# Patient Record
Sex: FEMALE | Race: OTHER | HISPANIC OR LATINO
[De-identification: names, ages, dates, MRNs, and addresses within clinical notes are randomized per-mention and may not be internally consistent; named-entity substitution may affect disease eponyms.]

---

## 2017-01-25 ENCOUNTER — APPOINTMENT (OUTPATIENT)
Dept: INTERNAL MEDICINE | Facility: CLINIC | Age: 47
End: 2017-01-25

## 2017-02-14 ENCOUNTER — APPOINTMENT (OUTPATIENT)
Dept: INTERNAL MEDICINE | Facility: CLINIC | Age: 47
End: 2017-02-14

## 2017-02-14 VITALS
DIASTOLIC BLOOD PRESSURE: 77 MMHG | HEIGHT: 60 IN | HEART RATE: 71 BPM | SYSTOLIC BLOOD PRESSURE: 114 MMHG | BODY MASS INDEX: 23.56 KG/M2 | WEIGHT: 120 LBS

## 2017-02-14 DIAGNOSIS — N61.1 ABSCESS OF THE BREAST AND NIPPLE: ICD-10-CM

## 2017-02-21 ENCOUNTER — CLINICAL ADVICE (OUTPATIENT)
Age: 47
End: 2017-02-21

## 2017-02-21 LAB
ANION GAP SERPL CALC-SCNC: 10 MEQ/L
BASOPHILS # BLD: 0.04 TH/MM3
BASOPHILS NFR BLD: 0.4 %
BUN SERPL-MCNC: 16 MG/DL
BUN/CREAT SERPL: 34 %
CALCIUM SERPL-MCNC: 9.9 MG/DL
CHLORIDE SERPL-SCNC: 104 MEQ/L
CHOLEST SERPL-MCNC: 180 MG/DL
CO2 SERPL-SCNC: 24 MEQ/L
CREAT SERPL-MCNC: 0.47 MG/DL
EOSINOPHIL # BLD: 0.2 TH/MM3
EOSINOPHIL NFR BLD: 2.2 %
ERYTHROCYTE [DISTWIDTH] IN BLOOD BY AUTOMATED COUNT: 18.4 %
ESTIMATED AVERGAGE GLUCOSE (NORTH): 126 MG/DL
GFR SERPL CREATININE-BSD FRML MDRD: 144
GLUCOSE SERPL-MCNC: 96 MG/DL
GRANULOCYTES # BLD: 5.45 TH/MM3
GRANULOCYTES NFR BLD: 60.1 %
HBA1C MFR BLD: 6 %
HCT VFR BLD AUTO: 35.4 %
HDLC SERPL-MCNC: 41 MG/DL
HDLC SERPL: 4.39
HGB BLD-MCNC: 11 G/DL
IMM GRANULOCYTES # BLD: 0.03 TH/MM3
IMM GRANULOCYTES NFR BLD: 0.3 %
LDLC SERPL DIRECT ASSAY-MCNC: 119 MG/DL
LYMPHOCYTES # BLD: 2.49 TH/MM3
LYMPHOCYTES NFR BLD: 27.4 %
MCH RBC QN AUTO: 24.2 PG
MCHC RBC AUTO-ENTMCNC: 31.1 G/DL
MCV RBC AUTO: 77.8 FL
MONOCYTES # BLD: 0.87 TH/MM3
MONOCYTES NFR BLD: 9.6 %
PLATELET # BLD: 364 TH/MM3
PMV BLD AUTO: 10.4 FL
POTASSIUM SERPL-SCNC: 4.3 MMOL/L
RBC # BLD AUTO: 4.55 MIL/MM3
SODIUM SERPL-SCNC: 138 MEQ/L
TRIGL SERPL-MCNC: 102 MG/DL
VLDLC SERPL-MCNC: 20 MG/DL
WBC # BLD: 9.08 TH/MM3

## 2017-02-23 ENCOUNTER — RESULT REVIEW (OUTPATIENT)
Age: 47
End: 2017-02-23

## 2017-02-23 PROBLEM — Z00.00 ENCOUNTER FOR PREVENTIVE HEALTH EXAMINATION: Noted: 2017-02-23

## 2017-02-27 ENCOUNTER — CLINICAL ADVICE (OUTPATIENT)
Age: 47
End: 2017-02-27

## 2017-02-27 LAB
FERRITIN SERPL-MCNC: 12 NG/ML
IRON SERPL-MCNC: 29 UG/DL
TIBC SERPL-MCNC: 555 UG/DL

## 2017-03-23 ENCOUNTER — APPOINTMENT (OUTPATIENT)
Dept: OBGYN | Facility: CLINIC | Age: 47
End: 2017-03-23

## 2017-05-06 ENCOUNTER — APPOINTMENT (OUTPATIENT)
Dept: INTERNAL MEDICINE | Facility: CLINIC | Age: 47
End: 2017-05-06

## 2017-05-10 ENCOUNTER — APPOINTMENT (OUTPATIENT)
Dept: BREAST CENTER | Facility: CLINIC | Age: 47
End: 2017-05-10

## 2017-05-10 VITALS
WEIGHT: 120 LBS | SYSTOLIC BLOOD PRESSURE: 100 MMHG | BODY MASS INDEX: 23.56 KG/M2 | DIASTOLIC BLOOD PRESSURE: 70 MMHG | HEIGHT: 60 IN

## 2017-05-11 ENCOUNTER — APPOINTMENT (OUTPATIENT)
Dept: OBGYN | Facility: CLINIC | Age: 47
End: 2017-05-11

## 2017-05-11 ENCOUNTER — OUTPATIENT (OUTPATIENT)
Dept: OUTPATIENT SERVICES | Facility: HOSPITAL | Age: 47
LOS: 1 days | Discharge: HOME | End: 2017-05-11

## 2017-05-11 ENCOUNTER — RESULT REVIEW (OUTPATIENT)
Age: 47
End: 2017-05-11

## 2017-05-11 VITALS
WEIGHT: 127 LBS | SYSTOLIC BLOOD PRESSURE: 100 MMHG | BODY MASS INDEX: 24.94 KG/M2 | DIASTOLIC BLOOD PRESSURE: 60 MMHG | HEIGHT: 60 IN

## 2017-05-15 ENCOUNTER — RESULT REVIEW (OUTPATIENT)
Age: 47
End: 2017-05-15

## 2017-05-25 LAB — HPV I/H RISK 1 DNA CVX QL PROBE+SIG AMP: NOT DETECTED

## 2017-06-28 DIAGNOSIS — Z01.419 ENCOUNTER FOR GYNECOLOGICAL EXAMINATION (GENERAL) (ROUTINE) WITHOUT ABNORMAL FINDINGS: ICD-10-CM

## 2017-07-08 ENCOUNTER — APPOINTMENT (OUTPATIENT)
Dept: INTERNAL MEDICINE | Facility: CLINIC | Age: 47
End: 2017-07-08

## 2017-07-08 ENCOUNTER — OUTPATIENT (OUTPATIENT)
Dept: OUTPATIENT SERVICES | Facility: HOSPITAL | Age: 47
LOS: 1 days | Discharge: HOME | End: 2017-07-08

## 2017-07-08 ENCOUNTER — RESULT REVIEW (OUTPATIENT)
Age: 47
End: 2017-07-08

## 2017-07-08 VITALS — SYSTOLIC BLOOD PRESSURE: 112 MMHG | DIASTOLIC BLOOD PRESSURE: 73 MMHG | HEIGHT: 60 IN | HEART RATE: 69 BPM

## 2017-07-08 VITALS — WEIGHT: 124 LBS | BODY MASS INDEX: 24.22 KG/M2

## 2017-07-08 DIAGNOSIS — N61.1 ABSCESS OF THE BREAST AND NIPPLE: ICD-10-CM

## 2017-07-08 RX ORDER — CHLORHEXIDINE GLUCONATE 4 %
325 (65 FE) LIQUID (ML) TOPICAL DAILY
Qty: 30 | Refills: 3 | Status: COMPLETED | COMMUNITY
Start: 2017-02-27 | End: 2017-06-08

## 2017-07-10 LAB
ABO + RH BLD: NORMAL
BASOPHILS # BLD: 0.03 TH/MM3
BASOPHILS NFR BLD: 0.3 %
BLD GP AB SCN SERPL QL: NEGATIVE
DIFFERENTIAL METHOD BLD: NORMAL
EOSINOPHIL # BLD: 0.24 TH/MM3
EOSINOPHIL NFR BLD: 2.4 %
ERYTHROCYTE [DISTWIDTH] IN BLOOD BY AUTOMATED COUNT: 14.5 %
FERRITIN SERPL-MCNC: 92 NG/ML
GRANULOCYTES # BLD: 5.87 TH/MM3
GRANULOCYTES NFR BLD: 58.3 %
HCT VFR BLD AUTO: 42.2 %
HGB BLD-MCNC: 13.9 G/DL
IMM GRANULOCYTES # BLD: 0.02 TH/MM3
IMM GRANULOCYTES NFR BLD: 0.2 %
LYMPHOCYTES # BLD: 2.96 TH/MM3
LYMPHOCYTES NFR BLD: 29.5 %
MCH RBC QN AUTO: 28.7 PG
MCHC RBC AUTO-ENTMCNC: 32.9 G/DL
MCV RBC AUTO: 87 FL
MONOCYTES # BLD: 0.93 TH/MM3
MONOCYTES NFR BLD: 9.3 %
PLATELET # BLD: 296 TH/MM3
PMV BLD AUTO: 11.1 FL
RBC # BLD AUTO: 4.85 MIL/MM3
TIBC SERPL-MCNC: 423 UG/DL
WBC # BLD: 10.05 TH/MM3

## 2017-08-04 ENCOUNTER — OUTPATIENT (OUTPATIENT)
Dept: OUTPATIENT SERVICES | Facility: HOSPITAL | Age: 47
LOS: 1 days | Discharge: HOME | End: 2017-08-04

## 2017-09-29 ENCOUNTER — OUTPATIENT (OUTPATIENT)
Dept: OUTPATIENT SERVICES | Facility: HOSPITAL | Age: 47
LOS: 1 days | Discharge: HOME | End: 2017-09-29

## 2017-09-29 DIAGNOSIS — K02.52 DENTAL CARIES ON PIT AND FISSURE SURFACE PENETRATING INTO DENTIN: ICD-10-CM

## 2017-10-06 ENCOUNTER — OUTPATIENT (OUTPATIENT)
Dept: OUTPATIENT SERVICES | Facility: HOSPITAL | Age: 47
LOS: 1 days | Discharge: HOME | End: 2017-10-06

## 2018-01-22 ENCOUNTER — OUTPATIENT (OUTPATIENT)
Dept: OUTPATIENT SERVICES | Facility: HOSPITAL | Age: 48
LOS: 1 days | Discharge: HOME | End: 2018-01-22

## 2018-01-22 DIAGNOSIS — Z12.31 ENCOUNTER FOR SCREENING MAMMOGRAM FOR MALIGNANT NEOPLASM OF BREAST: ICD-10-CM

## 2018-01-22 DIAGNOSIS — R92.8 OTHER ABNORMAL AND INCONCLUSIVE FINDINGS ON DIAGNOSTIC IMAGING OF BREAST: ICD-10-CM

## 2018-01-30 ENCOUNTER — OUTPATIENT (OUTPATIENT)
Dept: OUTPATIENT SERVICES | Facility: HOSPITAL | Age: 48
LOS: 1 days | Discharge: HOME | End: 2018-01-30

## 2018-02-02 ENCOUNTER — APPOINTMENT (OUTPATIENT)
Dept: BREAST CENTER | Facility: CLINIC | Age: 48
End: 2018-02-02
Payer: COMMERCIAL

## 2018-02-02 VITALS
HEART RATE: 77 BPM | BODY MASS INDEX: 24.35 KG/M2 | HEIGHT: 60 IN | DIASTOLIC BLOOD PRESSURE: 68 MMHG | SYSTOLIC BLOOD PRESSURE: 112 MMHG | OXYGEN SATURATION: 98 % | WEIGHT: 124 LBS

## 2018-02-02 PROCEDURE — 99213 OFFICE O/P EST LOW 20 MIN: CPT

## 2018-02-09 DIAGNOSIS — D24.2 BENIGN NEOPLASM OF LEFT BREAST: ICD-10-CM

## 2018-02-09 DIAGNOSIS — D24.1 BENIGN NEOPLASM OF RIGHT BREAST: ICD-10-CM

## 2018-02-09 DIAGNOSIS — N63.20 UNSPECIFIED LUMP IN THE LEFT BREAST, UNSPECIFIED QUADRANT: ICD-10-CM

## 2018-05-10 ENCOUNTER — APPOINTMENT (OUTPATIENT)
Dept: OBGYN | Facility: CLINIC | Age: 48
End: 2018-05-10

## 2018-05-10 ENCOUNTER — OUTPATIENT (OUTPATIENT)
Dept: OUTPATIENT SERVICES | Facility: HOSPITAL | Age: 48
LOS: 1 days | Discharge: HOME | End: 2018-05-10

## 2018-05-10 VITALS
BODY MASS INDEX: 24.35 KG/M2 | HEIGHT: 60 IN | SYSTOLIC BLOOD PRESSURE: 110 MMHG | DIASTOLIC BLOOD PRESSURE: 60 MMHG | WEIGHT: 124 LBS

## 2018-05-11 DIAGNOSIS — Z01.419 ENCOUNTER FOR GYNECOLOGICAL EXAMINATION (GENERAL) (ROUTINE) WITHOUT ABNORMAL FINDINGS: ICD-10-CM

## 2018-07-18 ENCOUNTER — OUTPATIENT (OUTPATIENT)
Dept: OUTPATIENT SERVICES | Facility: HOSPITAL | Age: 48
LOS: 1 days | Discharge: HOME | End: 2018-07-18

## 2018-07-29 ENCOUNTER — FORM ENCOUNTER (OUTPATIENT)
Age: 48
End: 2018-07-29

## 2018-07-30 ENCOUNTER — OUTPATIENT (OUTPATIENT)
Dept: OUTPATIENT SERVICES | Facility: HOSPITAL | Age: 48
LOS: 1 days | Discharge: HOME | End: 2018-07-30

## 2018-07-30 DIAGNOSIS — R92.8 OTHER ABNORMAL AND INCONCLUSIVE FINDINGS ON DIAGNOSTIC IMAGING OF BREAST: ICD-10-CM

## 2018-08-03 ENCOUNTER — APPOINTMENT (OUTPATIENT)
Dept: BREAST CENTER | Facility: CLINIC | Age: 48
End: 2018-08-03
Payer: COMMERCIAL

## 2018-08-03 PROCEDURE — 99213 OFFICE O/P EST LOW 20 MIN: CPT

## 2018-08-21 ENCOUNTER — OUTPATIENT (OUTPATIENT)
Dept: OUTPATIENT SERVICES | Facility: HOSPITAL | Age: 48
LOS: 1 days | Discharge: HOME | End: 2018-08-21

## 2018-08-21 ENCOUNTER — APPOINTMENT (OUTPATIENT)
Dept: INTERNAL MEDICINE | Facility: CLINIC | Age: 48
End: 2018-08-21

## 2018-08-21 VITALS
SYSTOLIC BLOOD PRESSURE: 133 MMHG | WEIGHT: 125 LBS | DIASTOLIC BLOOD PRESSURE: 85 MMHG | HEART RATE: 81 BPM | HEIGHT: 60 IN | BODY MASS INDEX: 24.54 KG/M2

## 2018-08-21 NOTE — HISTORY OF PRESENT ILLNESS
[FreeTextEntry1] : Follow Up [de-identified] : 48 Y F presents to the clinic for regular follow up. She denies any complains of chest pain, SOB, abdominal pain. No complains of dizziness , tiredness.

## 2018-08-21 NOTE — ASSESSMENT
[FreeTextEntry1] : #) History Iron Deficiency anemia\par Denies any signs of lethargy, tiredness, SOB. \par \par #) History of Fibroadenoma Left side\par Following up surgery and Gyn. Breast mass stable, Repeat  mammogram and US recommended on 1/22/19\par \par \par #)HCM\par \par Pap smear negative in 4/2107, mammogram scheduled in January, 2018\par Repeat routine labs- CBC, CMP, Hb A1c, Lipid profile

## 2018-08-21 NOTE — PHYSICAL EXAM
[No Acute Distress] : no acute distress [Well Nourished] : well nourished [Supple] : supple [No Respiratory Distress] : no respiratory distress  [Clear to Auscultation] : lungs were clear to auscultation bilaterally [Normal Rate] : normal rate  [Regular Rhythm] : with a regular rhythm [Normal S1, S2] : normal S1 and S2 [Soft] : abdomen soft [Non Tender] : non-tender [Normal Insight/Judgement] : insight and judgment were intact

## 2018-08-24 DIAGNOSIS — R73.9 HYPERGLYCEMIA, UNSPECIFIED: ICD-10-CM

## 2018-08-24 DIAGNOSIS — Z00.00 ENCOUNTER FOR GENERAL ADULT MEDICAL EXAMINATION WITHOUT ABNORMAL FINDINGS: ICD-10-CM

## 2018-08-27 LAB
25(OH)D3 SERPL-MCNC: 15 NG/ML
ALBUMIN SERPL ELPH-MCNC: 4.4 G/DL
ALP BLD-CCNC: 74 U/L
ALT SERPL-CCNC: 66 U/L
ANION GAP SERPL CALC-SCNC: 14 MMOL/L
AST SERPL-CCNC: 44 U/L
BASOPHILS # BLD AUTO: 0.07 K/UL
BASOPHILS NFR BLD AUTO: 0.8 %
BILIRUB SERPL-MCNC: 0.3 MG/DL
BUN SERPL-MCNC: 12 MG/DL
CALCIUM SERPL-MCNC: 10.1 MG/DL
CHLORIDE SERPL-SCNC: 103 MMOL/L
CHOLEST SERPL-MCNC: 169 MG/DL
CHOLEST/HDLC SERPL: 3.8 RATIO
CO2 SERPL-SCNC: 23 MMOL/L
CREAT SERPL-MCNC: 0.5 MG/DL
EOSINOPHIL # BLD AUTO: 0.2 K/UL
EOSINOPHIL NFR BLD AUTO: 2.3 %
ESTIMATED AVERAGE GLUCOSE: 120 MG/DL
GLUCOSE SERPL-MCNC: 93 MG/DL
HBA1C MFR BLD HPLC: 5.8 %
HCT VFR BLD CALC: 39.5 %
HDLC SERPL-MCNC: 44 MG/DL
HGB BLD-MCNC: 12.5 G/DL
IMM GRANULOCYTES NFR BLD AUTO: 0.3 %
IRON SATN MFR SERPL: 14 %
IRON SERPL-MCNC: 60 UG/DL
LDLC SERPL CALC-MCNC: 119 MG/DL
LYMPHOCYTES # BLD AUTO: 3.6 K/UL
LYMPHOCYTES NFR BLD AUTO: 41.7 %
MAN DIFF?: NORMAL
MCHC RBC-ENTMCNC: 27.4 PG
MCHC RBC-ENTMCNC: 31.6 G/DL
MCV RBC AUTO: 86.6 FL
MONOCYTES # BLD AUTO: 0.85 K/UL
MONOCYTES NFR BLD AUTO: 9.8 %
NEUTROPHILS # BLD AUTO: 3.89 K/UL
NEUTROPHILS NFR BLD AUTO: 45.1 %
PLATELET # BLD AUTO: 342 K/UL
POTASSIUM SERPL-SCNC: 4.1 MMOL/L
PROT SERPL-MCNC: 7.3 G/DL
RBC # BLD: 4.56 M/UL
RBC # FLD: 14.6 %
SODIUM SERPL-SCNC: 140 MMOL/L
TIBC SERPL-MCNC: 435 UG/DL
TRIGL SERPL-MCNC: 108 MG/DL
UIBC SERPL-MCNC: 375 UG/DL
WBC # FLD AUTO: 8.64 K/UL

## 2018-09-11 DIAGNOSIS — N13.30 UNSPECIFIED HYDRONEPHROSIS: ICD-10-CM

## 2018-09-12 ENCOUNTER — OUTPATIENT (OUTPATIENT)
Dept: OUTPATIENT SERVICES | Facility: HOSPITAL | Age: 48
LOS: 1 days | Discharge: HOME | End: 2018-09-12

## 2018-09-12 DIAGNOSIS — R74.8 ABNORMAL LEVELS OF OTHER SERUM ENZYMES: ICD-10-CM

## 2018-09-13 PROBLEM — N13.30 HYDRONEPHROSIS OF RIGHT KIDNEY: Status: ACTIVE | Noted: 2018-09-13

## 2019-02-08 ENCOUNTER — APPOINTMENT (OUTPATIENT)
Dept: BREAST CENTER | Facility: CLINIC | Age: 49
End: 2019-02-08
Payer: COMMERCIAL

## 2019-02-08 VITALS
BODY MASS INDEX: 24.54 KG/M2 | HEIGHT: 60 IN | SYSTOLIC BLOOD PRESSURE: 110 MMHG | TEMPERATURE: 98.4 F | WEIGHT: 125 LBS | DIASTOLIC BLOOD PRESSURE: 80 MMHG

## 2019-02-08 PROCEDURE — 99213 OFFICE O/P EST LOW 20 MIN: CPT

## 2019-02-10 NOTE — ASSESSMENT
[FreeTextEntry1] : Eladio is a 48F with a prior history of left breast abscess, now found to have biopsy proven fibroadeomas in bilateral breasts and bilateral BIRADS 3 breast masses.\par \par Oh physical exam, I do not palpate any suspicious abnormalities in either breast.  She additionally appears to have a hypertrophic scar in the area of her prior abscess site which we will continue to follow clinically. She is due for a b/l mammogram and US.  THis will be scheduled for her today. IF her repeat imaging does not reveal any other abnormalities, I will have her follow up in 6 months for a repeat R breast US for a mass @2-3:00, 4 cm FN BIRADS 3.\par \par Fibroadenomas are benign lesions without any malignant potential.  These are now classified as fibroproliferative lesions without atypia with a slightly increased relative risk compared to the reference population for breast cancer.  THey may increase or decrease in size and usually regress in post menopausal women. Indications for surgical excision include rapidly enlarging lesions and symptomatic lesions. At this time, neither lesion is symptomatic and thus surgical intervention is not required at this time. \par \par We also discussed dense breasts.  Increasing breast density has been found to increase ones risk of breast cancer, but at this time, there is no clear indication for additional imaging in this setting, as both US and MRI have not been found to improve survival.  One can consider bilateral screening US in this setting.  However, out of 1000 women screened, the use of routine US will only identify an additional 3-4 cancers.  The use of US was found to increase the likelihood of undergoing more imaging and more biopsies.  This was discussed with the patient as she does have dense breasts.  \par \par All of her questions were answered. She knows to call with any further questions or concerns.  \par \par PLAN: \par -b/l mammogram and US NOW\par -if repeat imaging is normal, then follow up in 6 months after a R breast US (BIRADS 3 lesion in right breast)

## 2019-02-10 NOTE — PAST MEDICAL HISTORY
[Perimenopausal] : The patient is perimenopausal [Menarche Age ____] : age at menarche was [unfilled] [Definite ___ (Date)] : the last menstrual period was [unfilled] [Total Preg ___] : G[unfilled] [Live Births ___] : P[unfilled]  [Age At Live Birth ___] : Age at live birth: [unfilled] [FreeTextEntry6] : denies  [FreeTextEntry7] : denies  [FreeTextEntry8] : denies

## 2019-02-10 NOTE — PHYSICAL EXAM
[Normocephalic] : normocephalic [No Supraclavicular Adenopathy] : no supraclavicular adenopathy [No Cervical Adenopathy] : no cervical adenopathy [No Axillary Lymphadenopathy] : no left axillary lymphadenopathy [EOMI] : extra ocular movement intact [Examined in the supine and seated position] : examined in the supine and seated position [No dominant masses] : no dominant masses in right breast  [No dominant masses] : no dominant masses left breast [No Nipple Retraction] : no left nipple retraction [No Nipple Discharge] : no left nipple discharge [Soft] : abdomen soft [Not Tender] : non-tender [No Edema] : no edema [No Rashes] : no rashes [No Ulceration] : no ulceration [de-identified] : no suspicious masses palpated  [de-identified] : hypertrophic scar present in lower inner quadrant, no other signs of abscess recurrence, no erythema or induration, fullness palpated in the UOQ.  no other discrete masses palpated.

## 2019-02-10 NOTE — REVIEW OF SYSTEMS
[As Noted in HPI] : as noted in HPI [Skin Lesions] : skin lesion [Negative] : Neurological [Fever] : no fever [Chills] : no chills [Feeling Poorly] : not feeling poorly [Feeling Tired] : not feeling tired [Breast Pain] : no breast pain [Breast Lump] : no breast lump [Hot Flashes] : no hot flashes

## 2019-02-10 NOTE — HISTORY OF PRESENT ILLNESS
[FreeTextEntry1] : Eladio is a 48F who presents as a 6 month follow up.  She was initially seen because of a left breast abscess for which she underwent a US guided drainage procedure without any further recurrence of the abscess. \par \par On her most recent imaging performed on 1/22/18, she was found to have 2 asymmetries in the L breast that effaced with spot compression and likely represented fibroglandular tissue.  She additionally underwent bilateral US which revealed the following: \par \par 1/22/18 -- bilateral breast US \par -R breast: @9:00, 2-3 cm from nipple, 2 x 1.3 x 0.8 cm irregular hypoechoic mass --> rec biopsy \par -R breast: @2-3:00, 4 cm from nipple, 1.1 x 1.1 x 0.4 cm circumscribed hypoechoic mass --> rec 6 month follow R breast US \par -L breast: @9:00, 5 cm from nipple, 1.3 x 1.1 x 0.6 cm irregular hypoechoic mass --> rec biopsy\par \par 1/30/18 -- bilateral US guided biopsies\par -R breast @9:00, 2-3 cm from nipple --> fibroadenoma \par -L breast @9:00, 5 cm from nipple --> fibroadenoma/PASH \par \par INTERVAL HISTORY: \par Eladio returns for a 6 month follow up.  \par She was scheduled for her screening mammogram/US as well as short term follow up for b/l BIRADS 3 breast masses but she has not gone for this testing yet.  \par \par She denies any breast related complaints at this time.  She denies any breast pain, no nipple discharge and no palpable lesions.  She denies any fevers or chills and has not had any recurrence of her left breast abscess.  Although she does have a residual skin lesion in the area of her prior abscess site.

## 2019-02-19 ENCOUNTER — FORM ENCOUNTER (OUTPATIENT)
Age: 49
End: 2019-02-19

## 2019-02-20 ENCOUNTER — OUTPATIENT (OUTPATIENT)
Dept: OUTPATIENT SERVICES | Facility: HOSPITAL | Age: 49
LOS: 1 days | Discharge: HOME | End: 2019-02-20

## 2019-02-20 DIAGNOSIS — R92.8 OTHER ABNORMAL AND INCONCLUSIVE FINDINGS ON DIAGNOSTIC IMAGING OF BREAST: ICD-10-CM

## 2019-03-09 ENCOUNTER — APPOINTMENT (OUTPATIENT)
Dept: INTERNAL MEDICINE | Facility: CLINIC | Age: 49
End: 2019-03-09

## 2019-03-09 ENCOUNTER — LABORATORY RESULT (OUTPATIENT)
Age: 49
End: 2019-03-09

## 2019-03-09 ENCOUNTER — OUTPATIENT (OUTPATIENT)
Dept: OUTPATIENT SERVICES | Facility: HOSPITAL | Age: 49
LOS: 1 days | Discharge: HOME | End: 2019-03-09

## 2019-03-09 VITALS
HEART RATE: 66 BPM | DIASTOLIC BLOOD PRESSURE: 75 MMHG | HEIGHT: 60 IN | WEIGHT: 124 LBS | SYSTOLIC BLOOD PRESSURE: 114 MMHG | BODY MASS INDEX: 24.35 KG/M2

## 2019-03-09 NOTE — PHYSICAL EXAM
[No Acute Distress] : no acute distress [Well Nourished] : well nourished [Well Developed] : well developed [Normal Sclera/Conjunctiva] : normal sclera/conjunctiva [Normal Outer Ear/Nose] : the outer ears and nose were normal in appearance [No JVD] : no jugular venous distention [No Lymphadenopathy] : no lymphadenopathy [Clear to Auscultation] : lungs were clear to auscultation bilaterally [Regular Rhythm] : with a regular rhythm [Normal S1, S2] : normal S1 and S2 [No Abdominal Bruit] : a ~M bruit was not heard ~T in the abdomen [Soft] : abdomen soft [Non Tender] : non-tender [Normal Bowel Sounds] : normal bowel sounds [No CVA Tenderness] : no CVA  tenderness [No Joint Swelling] : no joint swelling [No Rash] : no rash [Normal Gait] : normal gait

## 2019-03-11 LAB
ALBUMIN SERPL ELPH-MCNC: 4.3 G/DL
ALP BLD-CCNC: 64 U/L
ALT SERPL-CCNC: 69 U/L
ANION GAP SERPL CALC-SCNC: 11 MMOL/L
AST SERPL-CCNC: 49 U/L
BASOPHILS # BLD AUTO: 0.06 K/UL
BASOPHILS NFR BLD AUTO: 0.7 %
BILIRUB SERPL-MCNC: 0.3 MG/DL
BUN SERPL-MCNC: 14 MG/DL
CALCIUM SERPL-MCNC: 9.5 MG/DL
CHLORIDE SERPL-SCNC: 103 MMOL/L
CHOLEST SERPL-MCNC: 164 MG/DL
CHOLEST/HDLC SERPL: 3.9 RATIO
CO2 SERPL-SCNC: 25 MMOL/L
CREAT SERPL-MCNC: 0.5 MG/DL
EOSINOPHIL # BLD AUTO: 0.19 K/UL
EOSINOPHIL NFR BLD AUTO: 2.2 %
GLUCOSE SERPL-MCNC: 82 MG/DL
HCT VFR BLD CALC: 39.3 %
HDLC SERPL-MCNC: 42 MG/DL
HGB BLD-MCNC: 12.8 G/DL
IMM GRANULOCYTES NFR BLD AUTO: 0.1 %
LDLC SERPL CALC-MCNC: 115 MG/DL
LYMPHOCYTES # BLD AUTO: 3.47 K/UL
LYMPHOCYTES NFR BLD AUTO: 40.5 %
MAN DIFF?: NORMAL
MCHC RBC-ENTMCNC: 29 PG
MCHC RBC-ENTMCNC: 32.6 G/DL
MCV RBC AUTO: 88.9 FL
MONOCYTES # BLD AUTO: 0.79 K/UL
MONOCYTES NFR BLD AUTO: 9.2 %
NEUTROPHILS # BLD AUTO: 4.04 K/UL
NEUTROPHILS NFR BLD AUTO: 47.3 %
PLATELET # BLD AUTO: 322 K/UL
POTASSIUM SERPL-SCNC: 4.1 MMOL/L
PROT SERPL-MCNC: 7 G/DL
RBC # BLD: 4.42 M/UL
RBC # FLD: 14.1 %
SODIUM SERPL-SCNC: 139 MMOL/L
TRIGL SERPL-MCNC: 120 MG/DL
TSH SERPL-ACNC: 4.11 UIU/ML
WBC # FLD AUTO: 8.56 K/UL

## 2019-03-13 NOTE — REVIEW OF SYSTEMS
[Fever] : no fever [Chills] : no chills [Night Sweats] : no night sweats [Discharge] : no discharge [Earache] : no earache [Chest Pain] : no chest pain [Palpitations] : no palpitations [Orthopnea] : no orthopnea [Shortness Of Breath] : no shortness of breath [Wheezing] : no wheezing [Abdominal Pain] : no abdominal pain [Nausea] : no nausea [Constipation] : no constipation [Vomiting] : no vomiting [Dysuria] : no dysuria [Joint Pain] : no joint pain [Headache] : no headache [Suicidal] : not suicidal

## 2019-03-13 NOTE — HISTORY OF PRESENT ILLNESS
[FreeTextEntry1] : Routine follow-up - annual [de-identified] : 47 yo female patient with no pertinent past medical history, pesents to the clinic for regular follow up. She denies any complaints at this time.\par Patient has history of fibroadenoma, follows with mammogram every 6m (done last month).\par No active issues\par

## 2019-04-09 ENCOUNTER — OUTPATIENT (OUTPATIENT)
Dept: OUTPATIENT SERVICES | Facility: HOSPITAL | Age: 49
LOS: 1 days | Discharge: HOME | End: 2019-04-09

## 2019-05-06 ENCOUNTER — OUTPATIENT (OUTPATIENT)
Dept: OUTPATIENT SERVICES | Facility: HOSPITAL | Age: 49
LOS: 1 days | Discharge: HOME | End: 2019-05-06

## 2019-05-06 DIAGNOSIS — K03.0 EXCESSIVE ATTRITION OF TEETH: ICD-10-CM

## 2019-05-23 ENCOUNTER — LABORATORY RESULT (OUTPATIENT)
Age: 49
End: 2019-05-23

## 2019-05-23 ENCOUNTER — APPOINTMENT (OUTPATIENT)
Dept: OBGYN | Facility: CLINIC | Age: 49
End: 2019-05-23
Payer: COMMERCIAL

## 2019-05-23 ENCOUNTER — OUTPATIENT (OUTPATIENT)
Dept: OUTPATIENT SERVICES | Facility: HOSPITAL | Age: 49
LOS: 1 days | Discharge: HOME | End: 2019-05-23

## 2019-05-23 VITALS
BODY MASS INDEX: 23.95 KG/M2 | SYSTOLIC BLOOD PRESSURE: 100 MMHG | DIASTOLIC BLOOD PRESSURE: 60 MMHG | HEIGHT: 60 IN | WEIGHT: 122 LBS

## 2019-05-23 DIAGNOSIS — Z86.2 PERSONAL HISTORY OF DISEASES OF THE BLOOD AND BLOOD-FORMING ORGANS AND CERTAIN DISORDERS INVOLVING THE IMMUNE MECHANISM: ICD-10-CM

## 2019-05-23 PROCEDURE — 99396 PREV VISIT EST AGE 40-64: CPT

## 2019-05-23 NOTE — HISTORY OF PRESENT ILLNESS
[Sexually Active] : is sexually active [Male ___] : [unfilled] male [Definite:  ___ (Date)] : the last menstrual period was [unfilled] [Normal Amount/Duration] : was of a normal amount and duration [Regular Cycle Intervals] : periods have been regular [Frequency: Q ___ days] : menstrual periods occur approximately every [unfilled] days [Menarche Age: ____] : age at menarche was [unfilled] [Monogamous] : is monogamous [Contraception] : uses contraception [Spotting Between  Menses] : no spotting between menses [de-identified] :

## 2019-05-23 NOTE — END OF VISIT
[] : Resident [FreeTextEntry3] : Uncomplicated annual and cervical polypectomy. Follow up pathology. RTC 1 year for annual and pap.

## 2019-05-23 NOTE — PHYSICAL EXAM
[Awake] : awake [Soft] : soft [Normal] : uterus [Labia Majora] : labia major [Alert] : not alert [Acute Distress] : no acute distress [LAD] : no lymphadenopathy [Thyroid Nodule] : no thyroid nodule [Goiter] : no goiter [Mass] : no breast mass [Nipple Discharge] : no nipple discharge [Axillary LAD] : no axillary lymphadenopathy [Tender] : non tender [Distended] : not distended [H/Smegaly] : no hepatosplenomegaly [FreeTextEntry5] : pink cm polyp visualized at cervical os, removed without difficulty

## 2019-05-24 DIAGNOSIS — N84.1 POLYP OF CERVIX UTERI: ICD-10-CM

## 2019-05-24 DIAGNOSIS — Z01.419 ENCOUNTER FOR GYNECOLOGICAL EXAMINATION (GENERAL) (ROUTINE) WITHOUT ABNORMAL FINDINGS: ICD-10-CM

## 2019-07-30 ENCOUNTER — APPOINTMENT (OUTPATIENT)
Dept: INTERNAL MEDICINE | Facility: CLINIC | Age: 49
End: 2019-07-30

## 2019-07-30 ENCOUNTER — OUTPATIENT (OUTPATIENT)
Dept: OUTPATIENT SERVICES | Facility: HOSPITAL | Age: 49
LOS: 1 days | Discharge: HOME | End: 2019-07-30

## 2019-07-30 ENCOUNTER — RESULT CHARGE (OUTPATIENT)
Age: 49
End: 2019-07-30

## 2019-07-30 VITALS
TEMPERATURE: 97.9 F | HEART RATE: 79 BPM | DIASTOLIC BLOOD PRESSURE: 76 MMHG | BODY MASS INDEX: 24.74 KG/M2 | HEIGHT: 60 IN | WEIGHT: 126 LBS | SYSTOLIC BLOOD PRESSURE: 118 MMHG

## 2019-07-30 DIAGNOSIS — Z87.09 PERSONAL HISTORY OF OTHER DISEASES OF THE RESPIRATORY SYSTEM: ICD-10-CM

## 2019-07-31 DIAGNOSIS — J02.9 ACUTE PHARYNGITIS, UNSPECIFIED: ICD-10-CM

## 2019-08-07 LAB — S PYO AG SPEC QL IA: NORMAL

## 2019-08-08 NOTE — REVIEW OF SYSTEMS
[Chills] : chills [Fever] : fever [Fatigue] : fatigue [Sore Throat] : sore throat [Negative] : Neurological [Night Sweats] : no night sweats [Discharge] : no discharge [Hearing Loss] : no hearing loss [Hoarseness] : no hoarseness [Nasal Discharge] : no nasal discharge [Chest Pain] : no chest pain [Palpitations] : no palpitations [Orthopnea] : no orthopnea [Shortness Of Breath] : no shortness of breath [Wheezing] : no wheezing [Abdominal Pain] : no abdominal pain [Nausea] : no nausea [Constipation] : no constipation [Vomiting] : no vomiting [Dysuria] : no dysuria [Joint Pain] : no joint pain [Headache] : no headache [Suicidal] : not suicidal

## 2019-08-08 NOTE — PHYSICAL EXAM
[No Acute Distress] : no acute distress [Well Nourished] : well nourished [Well Developed] : well developed [Normal Sclera/Conjunctiva] : normal sclera/conjunctiva [Normal Outer Ear/Nose] : the outer ears and nose were normal in appearance [Clear to Auscultation] : lungs were clear to auscultation bilaterally [Regular Rhythm] : with a regular rhythm [Normal S1, S2] : normal S1 and S2 [No Abdominal Bruit] : a ~M bruit was not heard ~T in the abdomen [Soft] : abdomen soft [Non Tender] : non-tender [No CVA Tenderness] : no CVA  tenderness [No Joint Swelling] : no joint swelling [Normal Bowel Sounds] : normal bowel sounds [No Rash] : no rash [Normal Gait] : normal gait [No Respiratory Distress] : no respiratory distress  [No Edema] : there was no peripheral edema [de-identified] : left submandibular palpable LN [de-identified] : exudative discharge on the right tonsil

## 2019-08-08 NOTE — HISTORY OF PRESENT ILLNESS
[FreeTextEntry1] : Fever and sore throat  [de-identified] : 50 yo female patient with no pertinent past medical history, presents to the clinic for fever and sore throat. She mentioned that last saturday she started to have high fever + sore throat + fatigue. She denies any cough, or runny nose. Denies any sick contact. She also noted left submandibular LN. \par \par Of note, patient has h/o breast masses biopsied in 2018 showed fibroadenomas. Most recent mammo in February BI-RADS 3 recommended ultrasound follow up in 6 months. She is followed by breast surgery. She denies breast related complaints at this time.\par \par PAP: 2017 NILM, HPV not detected\par Mammo: 2/20/19 BI-RADS 3 \par \par Patient was last seen by GYN on 5/23/2019 . She had an uncomplicated cervical polyp removal with pap smear consistent with a benign polyp. \par

## 2019-08-08 NOTE — ASSESSMENT
[FreeTextEntry1] : 48 yo female patient with history of breast fibroadenoma, pesents to the clinic for regular follow up.\par \par # Fever + sore throat\par - Fever + exudative discharge + palpable LN with absence of cough\par - Rapid Strep A done in clinic: negative\par - Likely viral infection, supportive treatment.\par \par # Bilateral breast fibroadenoma\par - Last Mammo: 2/20/19 BI-RADS 3, repeat Mammogram in 6 months. \par - Will request a new mammogram\par - Following with breast surgery\par \par # Healthcare maintenance\par Uptodate with mammogram\par PAP: 2017 NILM, HPV not detected\par \par # Vitamin D deficiency\par on  vitamin D 2000 daily\par \par # Prediabetes\par HbA1c was 6, down trending to 5,5 most recently\par continue diet control \par \par RTC in 6 months

## 2019-08-20 ENCOUNTER — FORM ENCOUNTER (OUTPATIENT)
Age: 49
End: 2019-08-20

## 2019-08-21 ENCOUNTER — OUTPATIENT (OUTPATIENT)
Dept: OUTPATIENT SERVICES | Facility: HOSPITAL | Age: 49
LOS: 1 days | Discharge: HOME | End: 2019-08-21
Payer: OTHER GOVERNMENT

## 2019-08-21 DIAGNOSIS — R92.8 OTHER ABNORMAL AND INCONCLUSIVE FINDINGS ON DIAGNOSTIC IMAGING OF BREAST: ICD-10-CM

## 2019-08-21 PROCEDURE — 76642 ULTRASOUND BREAST LIMITED: CPT | Mod: 26,RT

## 2019-09-16 ENCOUNTER — APPOINTMENT (OUTPATIENT)
Dept: BREAST CENTER | Facility: CLINIC | Age: 49
End: 2019-09-16
Payer: COMMERCIAL

## 2019-09-16 VITALS
WEIGHT: 126 LBS | TEMPERATURE: 98.7 F | SYSTOLIC BLOOD PRESSURE: 116 MMHG | BODY MASS INDEX: 24.74 KG/M2 | HEIGHT: 60 IN | DIASTOLIC BLOOD PRESSURE: 78 MMHG

## 2019-09-16 PROCEDURE — 99213 OFFICE O/P EST LOW 20 MIN: CPT

## 2019-09-16 NOTE — ASSESSMENT
[FreeTextEntry1] : Eladio is a 49F with a prior history of left breast abscess, now found to have biopsy proven fibroadeomas in bilateral breasts and a right breast BIRADS 3 mass.\par \par Oh physical exam, I do not palpate any suspicious abnormalities in either breast, although she does have nondiscrete nodularities in both breasts.  She additionally appears to have a hypertrophic scar in the area of her prior abscess site which we will continue to follow clinically. \par \par She had a b/l dx mammogram and R breast US on 2/20/19, which revealed stability of her R breast BIRADS 3 lesion @2-3N4, stable probably benign hypoechoic mass, measuring 1.2 x 1 x 0.4 cm.\par \par She also had a repeat R breast US on 8/21/19 which revealed stability of her oval circumscribed mass @2-3N4, measuring 1.1 x 1 x 0.4 cm, deemed BIRADS 3. \par \par Her next imaging will be a b/l mammogram and b/l US on 2/20/2020.  THis will be scheduled for her today. I will have her follow up in 6 months after her repeat imaging for a CBE. \par \par Fibroadenomas are benign lesions without any malignant potential.  These are now classified as fibroproliferative lesions without atypia with a slightly increased relative risk compared to the reference population for breast cancer.  THey may increase or decrease in size and usually regress in post menopausal women. Indications for surgical excision include rapidly enlarging lesions and symptomatic lesions. At this time, neither lesion is symptomatic and thus surgical intervention is not required at this time. \par \par We also discussed dense breasts.  Increasing breast density has been found to increase ones risk of breast cancer, but at this time, there is no clear indication for additional imaging in this setting, as both US and MRI have not been found to improve survival.  One can consider bilateral screening US in this setting.  However, out of 1000 women screened, the use of routine US will only identify an additional 3-4 cancers.  The use of US was found to increase the likelihood of undergoing more imaging and more biopsies.  This was discussed with the patient as she does have dense breasts.  \par \par All of her questions were answered. She knows to call with any further questions or concerns.  \par \par PLAN: \par -b/l mammogram and b/l US (dense breast screening, R breast BIRADS 3) for 2/20/2020, f/up after

## 2019-09-16 NOTE — PHYSICAL EXAM
[Normocephalic] : normocephalic [EOMI] : extra ocular movement intact [No Supraclavicular Adenopathy] : no supraclavicular adenopathy [Examined in the supine and seated position] : examined in the supine and seated position [No Cervical Adenopathy] : no cervical adenopathy [No dominant masses] : no dominant masses in right breast  [No dominant masses] : no dominant masses left breast [No Nipple Retraction] : no left nipple retraction [No Nipple Discharge] : no right nipple discharge [No Axillary Lymphadenopathy] : no left axillary lymphadenopathy [Soft] : abdomen soft [Not Tender] : non-tender [No Rashes] : no rashes [No Edema] : no edema [No Ulceration] : no ulceration [de-identified] : no suspicious masses palpated, however she does have nodularities present @9-10N2, @7-8N1 [de-identified] : hypertrophic scar present in lower inner quadrant, no other signs of abscess recurrence, no erythema or induration, fullness palpated in the UOQ.  no other discrete masses palpated. nodularity palpated @2N1-2

## 2019-09-16 NOTE — HISTORY OF PRESENT ILLNESS
[FreeTextEntry1] : Eladio is a 49F who presents as a 6 month follow up.  She was initially seen because of a left breast abscess for which she underwent a US guided drainage procedure without any further recurrence of the abscess. \par \par On her most recent imaging performed on 1/22/18, she was found to have 2 asymmetries in the L breast that effaced with spot compression and likely represented fibroglandular tissue.  She additionally underwent bilateral US which revealed the following: \par \par 1/22/18 -- bilateral breast US \par -R breast: @9:00, 2-3 cm from nipple, 2 x 1.3 x 0.8 cm irregular hypoechoic mass --> rec biopsy \par -R breast: @2-3:00, 4 cm from nipple, 1.1 x 1.1 x 0.4 cm circumscribed hypoechoic mass --> rec 6 month follow R breast US \par -L breast: @9:00, 5 cm from nipple, 1.3 x 1.1 x 0.6 cm irregular hypoechoic mass --> rec biopsy\par \par 1/30/18 -- bilateral US guided biopsies\par -R breast @9:00, 2-3 cm from nipple --> fibroadenoma \par -L breast @9:00, 5 cm from nipple --> fibroadenoma/PASH \par \par INTERVAL HISTORY: \par Eladio returns for a 6 month follow up.  \par \par She denies any breast related complaints at this time.  She denies any breast pain, no nipple discharge and no palpable lesions.  She denies any fevers or chills and has not had any recurrence of her left breast abscess.  Although she does have a residual skin lesion in the area of her prior abscess site. \par \par Since her last visit, she had a b/l dx mammogram and R breast US on 2/20/19, which revealed stability of her R breast BIRADS 3 lesion @2-3N4, stable probably benign hypoechoic mass, measuring 1.2 x 1 x 0.4 cm, deemed BIRADS 3.  \par \par She also had a repeat R breast US on 8/21/19 which revealed stability of her oval circumscribed mass @2-3N4, measuring 1.1 x 1 x 0.4 cm, deemed BIRADS 3.

## 2019-09-16 NOTE — DATA REVIEWED
[FreeTextEntry1] : EXAM: US BREAST LIMITED RT\par \par \par PROCEDURE DATE: 08/21/2019\par \par \par \par INTERPRETATION: Clinical History / Reason for exam: Follow-up for right\par breast mass.\par \par The patient reports her last clinical breast examination was performed\par uncertain..\par \par Family history: No family history of breast cancer.\par \par Comparisons: Breast ultrasound dating back to 2018.\par \par Findings:\par \par Ultrasound:\par \par Targeted unilateral right breast ultrasound was performed.\par \par Again noted in the right breast is a stable oval circumscribed mass at 2 - 3\par o'clock position, 4 cm from the nipple measuring 1.1 x 1.0 x 0.4 cm. It\par previously measured 1.2 x 1.1 x 0.4 cm. Short-term interval follow-up in 6\par months recommended.\par \par Impression: Stable probably benign right breast mass as described above.\par \par Recommendation: Follow-up bilateral diagnostic mammogram and right\par ultrasound in 6 months.\par \par BI-RADS category 3: Probably Benign\par \par The above findings and recommendations were discussed with the patient at\par the time of the examination.\par \par \par \par \par \par \par PUMA CASTILLO M.D., ATTENDING RADIOLOGIST\par This document has been electronically signed. Aug 21 2019 12:44PM\par \par EXAM: US BREAST LIMITED RT\par EXAM: MG MAMMO DIAG W WARNER BI#\par \par \par PROCEDURE DATE: 02/20/2019\par \par \par \par INTERPRETATION: Clinical History / Reason for exam: Short-term follow-up of\par a probably benign right breast mass at the 2 to 3:00 position. This was\par first identified in January 2018. Additional history:\par \par The patient reports her last clinical breast examination was performed with\par thin the past month..\par \par Family history: None\par \par Comparisons: Priors dating back to 2017.\par \par Views obtained:Bilateral tomographic full field CC and MLO views..\par \par Computer-aided detection was utilized in the interpretation of this\par examination.\par \par Breast composition:The breasts are heterogeneously dense, which may obscure\par small masses.\par \par Findings:\par \par Mammogram:\par \par No suspicious mass, microcalcifications or areas of architectural distortion\par is seen either breast. When compared to the previous examinations there is\par no significant interval change and nothing to suggest malignancy.\par \par Ultrasound:\par \par Targeted unilateral right breast ultrasound was performed.\par \par At the 2 to 3:00 position 4 cm from nipple, there is a stable probably\par benign hypoechoic mass measuring 1.2 x 1.1 x 0.4 cm. This is stable compared\par to prior exams.\par \par Impression: No mammographic or sonographic evidence of malignancy. Stable\par probably benign mass at the right breast 2 to 3:00 position for which\par short-term sonographic follow-up is recommended.\par \par Recommendation: Follow-up breast ultrasound in 6 months.\par \par BI-RADS category 3: Probably Benign\par \par \par \par \par The above findings and recommendations were discussed with the patient at\par the time of the examination.\par \par \par \par \par \par \par KIRSTEN HAINES M.D., ATTENDING RADIOLOGIST\par This document has been electronically signed. Feb 20 2019 12:15PM\par

## 2019-09-16 NOTE — REVIEW OF SYSTEMS
[As Noted in HPI] : as noted in HPI [Skin Lesions] : skin lesion [Negative] : Neurological [Fever] : no fever [Feeling Poorly] : not feeling poorly [Chills] : no chills [Breast Lump] : no breast lump [Breast Pain] : no breast pain [Feeling Tired] : not feeling tired [Hot Flashes] : no hot flashes

## 2020-01-21 ENCOUNTER — APPOINTMENT (OUTPATIENT)
Dept: INTERNAL MEDICINE | Facility: CLINIC | Age: 50
End: 2020-01-21
Payer: COMMERCIAL

## 2020-01-21 ENCOUNTER — OUTPATIENT (OUTPATIENT)
Dept: OUTPATIENT SERVICES | Facility: HOSPITAL | Age: 50
LOS: 1 days | Discharge: HOME | End: 2020-01-21

## 2020-01-21 VITALS
SYSTOLIC BLOOD PRESSURE: 136 MMHG | WEIGHT: 129 LBS | DIASTOLIC BLOOD PRESSURE: 85 MMHG | TEMPERATURE: 97.3 F | HEART RATE: 85 BPM | BODY MASS INDEX: 25.32 KG/M2 | HEIGHT: 60 IN

## 2020-01-21 PROCEDURE — 99213 OFFICE O/P EST LOW 20 MIN: CPT | Mod: GC

## 2020-01-21 RX ORDER — BENZOCAINE/MENTH/CETYLPYRD CL 15 MG-2 MG
10-2.1 LOZENGE MUCOUS MEMBRANE
Qty: 30 | Refills: 0 | Status: DISCONTINUED | COMMUNITY
Start: 2019-07-30 | End: 2020-01-21

## 2020-01-29 LAB
ALBUMIN SERPL ELPH-MCNC: 4.4 G/DL
ALP BLD-CCNC: 72 U/L
ALT SERPL-CCNC: 64 U/L
ANION GAP SERPL CALC-SCNC: 13 MMOL/L
AST SERPL-CCNC: 47 U/L
BASOPHILS # BLD AUTO: 0.07 K/UL
BASOPHILS NFR BLD AUTO: 0.9 %
BILIRUB SERPL-MCNC: 0.5 MG/DL
BUN SERPL-MCNC: 10 MG/DL
CALCIUM SERPL-MCNC: 9.8 MG/DL
CHLORIDE SERPL-SCNC: 101 MMOL/L
CHOLEST SERPL-MCNC: 169 MG/DL
CHOLEST/HDLC SERPL: 3.8 RATIO
CO2 SERPL-SCNC: 24 MMOL/L
CREAT SERPL-MCNC: <0.5 MG/DL
EOSINOPHIL # BLD AUTO: 0.08 K/UL
EOSINOPHIL NFR BLD AUTO: 1 %
ESTIMATED AVERAGE GLUCOSE: 123 MG/DL
GLUCOSE SERPL-MCNC: 83 MG/DL
HBA1C MFR BLD HPLC: 5.9 %
HCT VFR BLD CALC: 39.2 %
HDLC SERPL-MCNC: 45 MG/DL
HGB BLD-MCNC: 12.6 G/DL
IMM GRANULOCYTES NFR BLD AUTO: 0.3 %
LDLC SERPL CALC-MCNC: 114 MG/DL
LYMPHOCYTES # BLD AUTO: 2.52 K/UL
LYMPHOCYTES NFR BLD AUTO: 32.2 %
MAN DIFF?: NORMAL
MCHC RBC-ENTMCNC: 27.5 PG
MCHC RBC-ENTMCNC: 32.1 G/DL
MCV RBC AUTO: 85.6 FL
MONOCYTES # BLD AUTO: 0.77 K/UL
MONOCYTES NFR BLD AUTO: 9.8 %
NEUTROPHILS # BLD AUTO: 4.37 K/UL
NEUTROPHILS NFR BLD AUTO: 55.8 %
PLATELET # BLD AUTO: 337 K/UL
POTASSIUM SERPL-SCNC: 4.4 MMOL/L
PROT SERPL-MCNC: 7.6 G/DL
RBC # BLD: 4.58 M/UL
RBC # FLD: 13.1 %
SODIUM SERPL-SCNC: 138 MMOL/L
TRIGL SERPL-MCNC: 117 MG/DL
TSH SERPL-ACNC: 2.56 UIU/ML
WBC # FLD AUTO: 7.83 K/UL

## 2020-02-06 NOTE — HISTORY OF PRESENT ILLNESS
[FreeTextEntry1] : Regular follow-up [de-identified] : 48 yo female patient with no pertinent past medical history, presents to the clinic for regular follow-up. She was last seen 6 months ago for sore throat. Otherwise been healthy the last 6 months, following with breast surgery for breast fibroadenoma. \par \par Of note, patient has h/o breast masses biopsied in 2018 showed fibroadenomas. She denies breast related complaints at this time.\par \par PAP: 2017 NILM, HPV not detected\par Mammo: 2/20/19 BI-RADS 3 \par \par

## 2020-02-06 NOTE — PHYSICAL EXAM
[No Acute Distress] : no acute distress [Well Developed] : well developed [Well Nourished] : well nourished [Normal Sclera/Conjunctiva] : normal sclera/conjunctiva [Normal Outer Ear/Nose] : the outer ears and nose were normal in appearance [Normal S1, S2] : normal S1 and S2 [Clear to Auscultation] : lungs were clear to auscultation bilaterally [Regular Rhythm] : with a regular rhythm [No Respiratory Distress] : no respiratory distress  [No Edema] : there was no peripheral edema [Soft] : abdomen soft [No Abdominal Bruit] : a ~M bruit was not heard ~T in the abdomen [Normal Bowel Sounds] : normal bowel sounds [Non Tender] : non-tender [No Joint Swelling] : no joint swelling [No CVA Tenderness] : no CVA  tenderness [No Rash] : no rash [Normal Gait] : normal gait [Normal Oropharynx] : the oropharynx was normal [Supple] : supple [de-identified] : exudative discharge on the right tonsil [de-identified] : left submandibular palpable LN

## 2020-02-06 NOTE — REVIEW OF SYSTEMS
[Negative] : ENT [Fever] : no fever [Chills] : no chills [Night Sweats] : no night sweats [Fatigue] : no fatigue [Palpitations] : no palpitations [Chest Pain] : no chest pain [Orthopnea] : no orthopnea [Shortness Of Breath] : no shortness of breath [Abdominal Pain] : no abdominal pain [Wheezing] : no wheezing [Nausea] : no nausea [Constipation] : no constipation [Vomiting] : no vomiting [Joint Pain] : no joint pain [Dysuria] : no dysuria [Suicidal] : not suicidal [Headache] : no headache

## 2020-02-06 NOTE — ASSESSMENT
[FreeTextEntry1] : 48 yo female patient with history of breast fibroadenoma, presents to the clinic for regular follow up.\par \par # Bilateral breast fibroadenoma\par - Last Mammo: 2/20/19 BI-RADS 3,  will request a new mammogram\par - Last US breast on 8/2019: BI-RADs 3 probably benign\par - Following with breast surgery\par \par # Healthcare maintenance\par Uptodate with mammogram\par PAP: 2017 NILM, HPV not detected\par refusing flu vaccine\par \par # Vitamin D deficiency\par on  vitamin D 2000 daily\par \par # Prediabetes\par HbA1c was 6, down trending to 5,5 most recently\par continue diet control \par will recheck Hba1c\par \par RTC in 12 months

## 2020-02-23 ENCOUNTER — FORM ENCOUNTER (OUTPATIENT)
Age: 50
End: 2020-02-23

## 2020-02-24 ENCOUNTER — OUTPATIENT (OUTPATIENT)
Dept: OUTPATIENT SERVICES | Facility: HOSPITAL | Age: 50
LOS: 1 days | Discharge: HOME | End: 2020-02-24
Payer: OTHER GOVERNMENT

## 2020-02-24 DIAGNOSIS — R92.8 OTHER ABNORMAL AND INCONCLUSIVE FINDINGS ON DIAGNOSTIC IMAGING OF BREAST: ICD-10-CM

## 2020-02-24 PROCEDURE — G0279: CPT | Mod: 26

## 2020-02-24 PROCEDURE — 76641 ULTRASOUND BREAST COMPLETE: CPT | Mod: 26,50

## 2020-02-24 PROCEDURE — 77066 DX MAMMO INCL CAD BI: CPT | Mod: 26

## 2020-02-28 ENCOUNTER — APPOINTMENT (OUTPATIENT)
Dept: BREAST CENTER | Facility: CLINIC | Age: 50
End: 2020-02-28
Payer: COMMERCIAL

## 2020-02-28 VITALS
WEIGHT: 129 LBS | DIASTOLIC BLOOD PRESSURE: 86 MMHG | SYSTOLIC BLOOD PRESSURE: 126 MMHG | TEMPERATURE: 98.5 F | BODY MASS INDEX: 25.32 KG/M2 | HEIGHT: 60 IN

## 2020-02-28 PROCEDURE — 99213 OFFICE O/P EST LOW 20 MIN: CPT

## 2020-02-28 NOTE — DATA REVIEWED
[FreeTextEntry1] : EXAM: US BREAST COMPLETE BI\par EXAM: MG MAMMO DIAG W WARNER BI#\par \par \par PROCEDURE DATE: 02/24/2020\par \par \par \par INTERPRETATION: Clinical History/Reason for exam: 49-year-old female\par presenting for short term follow-up right breast ultrasound as well as\par bilateral yearly mammography and sonography.\par \par Additional History: Short interval follow-up for probably benign right\par breast 2-3 o'clock, N4 mass since 1/22/2018.\par The patient has no personal history of breast cancer. The patient has\par history of bilateral benign ultrasound-guided biopsies in January 2018. The\par patient does not report a family history of breast cancer.\par \par Clinical Breast Exam: 6 months ago\par \par Comparison: Multiple prior mammograms dating back to 2017\par \par Breast composition:The breasts are heterogeneously dense, which may obscure\par small masses.\par \par Views: 2-D and tomographic views of the bilateral breasts. Aided\par interpretation was provided by the [employer] CAD system.\par \par Findings:\par \par Mammogram:\par \par In the lower outer quadrant of the right breast, there is new roughly 4 mm\par mass, posterior third depth. 2 metallic clips are seen in the bilateral\par outer breasts, one on each side. The previously seen asymmetry in the right\par medial breast is stable.\par \par Otherwise no microcalcifications or areas of architectural distortion is\par seen either breast.\par \par Ultrasound:\par \par Bilateral high-resolution screening ultrasound was performed. Comparison is\par made to prior studies dating back to 1/22/2018.\par \par The background parenchymal echotexture is heterogeneous.\par \par At the right 7:00 axis, 4 cm from the nipple, there is a 4 x 4 x 3 mm\par anechoic benign cyst which would correspond to the new mammographic finding.\par \par At the right 2-3 o'clock axis, 4 cm from the nipple, 9 x 9 x 4 mm oval\par hypoechoic mass, stable.\par \par At the right 9:00 axis, 2 to 3 cm from the nipple, previously biopsied 16 x\par 12 x 8 mm hypoechoic mass, stable.\par \par At the left 2:00 axis, 5 cm from the nipple, previously biopsied 12 x 9 x 5\par mm hypoechoic mass, stable.\par \par There is no new solid mass. No axillary lymphadenopathy.\par \par Impression:\par \par Mammography demonstrates stable right medial breast asymmetry and a new mass\par in the right lower outer quadrant which corresponds to a benign cyst on\par ultrasound.\par \par Ultrasound demonstrates stable benign masss bilaterally as above.\par \par No significant interval change to suggest malignancy.\par \par Recommendation: Unless otherwise indicated by clinical findings, annual\par screening mammography and ultrasound are recommended.\par \par BI-RADS Category 2: Benign\par \par The findings and recommendation were discussed with the patient at the time\par of the exam.\par \par \par \par \par \par \par \par \par \par LEO CHAVEZ M.D., RESIDENT RADIOLOGIST\par This document has been electronically signed.\par GILES JUNIOR M.D., ATTENDING RADIOLOGIST\par This document has been electronically signed. Feb 24 2020 11:34AM\par \par

## 2020-02-28 NOTE — PHYSICAL EXAM
[Normocephalic] : normocephalic [EOMI] : extra ocular movement intact [No Supraclavicular Adenopathy] : no supraclavicular adenopathy [No Cervical Adenopathy] : no cervical adenopathy [Examined in the supine and seated position] : examined in the supine and seated position [No dominant masses] : no dominant masses in right breast  [No dominant masses] : no dominant masses left breast [No Nipple Retraction] : no left nipple retraction [No Nipple Discharge] : no left nipple discharge [No Axillary Lymphadenopathy] : no left axillary lymphadenopathy [Soft] : abdomen soft [Not Tender] : non-tender [No Rashes] : no rashes [No Edema] : no edema [No Ulceration] : no ulceration [de-identified] : hypertrophic scar present in lower inner quadrant, no other signs of abscess recurrence, no erythema or induration, fullness palpated in the UOQ.  no other discrete masses palpated. nodularity palpated @2N1-2 [de-identified] : no suspicious masses palpated, however she does have nodularities present @9-10N2, @7-8N1

## 2020-02-28 NOTE — ASSESSMENT
[FreeTextEntry1] : Eladio is a 49F with a prior history of left breast abscess, now found to have biopsy proven fibroadenomas in bilateral breasts and a right breast BIRADS 3 mass, now deemed BIRADS 2.\par \par Oh physical exam, I do not palpate any suspicious abnormalities in either breast, although she does have nondiscrete nodularities in both breasts.  She additionally appears to have a hypertrophic scar in the area of her prior abscess site which we will continue to follow clinically. \par \par Her most recent imaging was a b/l dx mammogram and b/l breast US on 2/24/2020, which revealed a new right breast cyst @7N4, measuring 4 x 4 x 3 mm, stability of her R breast BIRADS 3 lesion @2-3N4, and stability of her biopsy proven fibroadenomas, in her right breast  @2-3N4, and in her left breast @2N5, deemed BIRADS 2.  \par \par Her next imaging will be a b/l mammogram and b/l US on 2/24/2021.  THis will be scheduled for her today. I will have her follow up in1 year after her repeat imaging for a CBE. \par \par Fibroadenomas are benign lesions without any malignant potential.  These are now classified as fibroproliferative lesions without atypia with a slightly increased relative risk compared to the reference population for breast cancer.  THey may increase or decrease in size and usually regress in post menopausal women. Indications for surgical excision include rapidly enlarging lesions and symptomatic lesions. At this time, neither lesion is symptomatic and thus surgical intervention is not required at this time. \par \par We also discussed dense breasts.  Increasing breast density has been found to increase ones risk of breast cancer, but at this time, there is no clear indication for additional imaging in this setting, as both US and MRI have not been found to improve survival.  One can consider bilateral screening US in this setting.  However, out of 1000 women screened, the use of routine US will only identify an additional 3-4 cancers.  The use of US was found to increase the likelihood of undergoing more imaging and more biopsies.  This was discussed with the patient as she does have dense breasts.  \par \par We discussed breast cysts. They are not pre-malignant nor do they have malignant potential and are hormonally influenced.  They may grow or shrink in size as well as resolve spontaneously and there is usually no intervention unless they are symptomatic.  In several large studies, patients with breast cysts and a positive family history had a higher relative risk of breast cancer (from 1.5 to 3).  She is asymptomatic from her right breast cyst so no intervention will be performed at this time.\par \par All of her questions were answered. She knows to call with any further questions or concerns.  \par \par PLAN: \par -b/l mammogram and b/l US (dense breast screening) for 2/24/2021, f/up after

## 2020-02-28 NOTE — REVIEW OF SYSTEMS
[As Noted in HPI] : as noted in HPI [Negative] : Neurological [Chills] : no chills [Fever] : no fever [Feeling Poorly] : not feeling poorly [Feeling Tired] : not feeling tired [Breast Pain] : no breast pain [Skin Lesions] : no skin lesions [Hot Flashes] : no hot flashes [Breast Lump] : no breast lump

## 2020-02-28 NOTE — HISTORY OF PRESENT ILLNESS
[FreeTextEntry1] : Eladio is a 49F who presents as a 6 month follow up.  She was initially seen because of a left breast abscess for which she underwent a US guided drainage procedure without any further recurrence of the abscess. \par \par On her most recent imaging performed on 1/22/18, she was found to have 2 asymmetries in the L breast that effaced with spot compression and likely represented fibroglandular tissue.  She additionally underwent bilateral US which revealed the following: \par \par 1/22/18 -- bilateral breast US \par -R breast: @9:00, 2-3 cm from nipple, 2 x 1.3 x 0.8 cm irregular hypoechoic mass --> rec biopsy \par -R breast: @2-3:00, 4 cm from nipple, 1.1 x 1.1 x 0.4 cm circumscribed hypoechoic mass --> rec 6 month follow R breast US \par -L breast: @9:00, 5 cm from nipple, 1.3 x 1.1 x 0.6 cm irregular hypoechoic mass --> rec biopsy\par \par 1/30/18 -- bilateral US guided biopsies\par -R breast @9:00, 2-3 cm from nipple --> fibroadenoma \par -L breast @9:00, 5 cm from nipple --> fibroadenoma/PASH \par \par INTERVAL HISTORY: \par Eladio returns for a 6 month follow up.  \par \par She denies any breast related complaints at this time.  She denies any breast pain, no nipple discharge and no palpable lesions.  She denies any fevers or chills and has not had any recurrence of her left breast abscess.  Although she does have a residual skin lesion in the area of her prior abscess site. \par \par Since her last visit, she had a b/l dx mammogram and b/l breast US on 2/24/2020, which revealed a new right breast cyst @7N4, measuring 4 x 4 x 3 mm, stability of her R breast BIRADS 3 lesion @2-3N4, and stability of her biopsy proven fibroadenomas, in her right breast  @2-3N4, and in her left breast @2N5, deemed BIRADS 2.

## 2020-03-06 ENCOUNTER — OUTPATIENT (OUTPATIENT)
Dept: OUTPATIENT SERVICES | Facility: HOSPITAL | Age: 50
LOS: 1 days | Discharge: HOME | End: 2020-03-06

## 2020-03-06 DIAGNOSIS — Z01.20 ENCOUNTER FOR DENTAL EXAMINATION AND CLEANING WITHOUT ABNORMAL FINDINGS: ICD-10-CM

## 2020-05-28 ENCOUNTER — APPOINTMENT (OUTPATIENT)
Dept: OBGYN | Facility: CLINIC | Age: 50
End: 2020-05-28
Payer: COMMERCIAL

## 2020-05-28 ENCOUNTER — OUTPATIENT (OUTPATIENT)
Dept: OUTPATIENT SERVICES | Facility: HOSPITAL | Age: 50
LOS: 1 days | Discharge: HOME | End: 2020-05-28

## 2020-05-28 VITALS
BODY MASS INDEX: 25.52 KG/M2 | HEIGHT: 60 IN | DIASTOLIC BLOOD PRESSURE: 80 MMHG | SYSTOLIC BLOOD PRESSURE: 130 MMHG | WEIGHT: 130 LBS

## 2020-05-28 PROCEDURE — 99396 PREV VISIT EST AGE 40-64: CPT

## 2020-05-28 NOTE — HISTORY OF PRESENT ILLNESS
[Sexually Active] : is sexually active [Contraception] : uses contraception [Monogamous] : is monogamous [Male ___] : [unfilled] male

## 2020-05-28 NOTE — PHYSICAL EXAM
[Awake] : awake [Alert] : alert [Soft] : soft [Oriented x3] : oriented to person, place, and time [Normal] : uterus [No Bleeding] : there was no active vaginal bleeding [Pap Obtained] : a Pap smear was performed [Uterine Adnexae] : were not tender and not enlarged [Acute Distress] : no acute distress [Nipple Discharge] : no nipple discharge [Mass] : no breast mass [Axillary LAD] : no axillary lymphadenopathy [Tender] : non tender [Tenderness] : no tenderness [Discharge] : had no discharge [Motion Tenderness] : there was no cervical motion tenderness [Adnexa Tenderness] : were not tender [Ovarian Mass (___ Cm)] : there were no adnexal masses

## 2020-06-04 LAB — HPV HIGH+LOW RISK DNA PNL CVX: NOT DETECTED

## 2020-06-15 ENCOUNTER — OUTPATIENT (OUTPATIENT)
Dept: OUTPATIENT SERVICES | Facility: HOSPITAL | Age: 50
LOS: 1 days | Discharge: HOME | End: 2020-06-15

## 2020-06-15 ENCOUNTER — APPOINTMENT (OUTPATIENT)
Dept: INTERNAL MEDICINE | Facility: CLINIC | Age: 50
End: 2020-06-15
Payer: COMMERCIAL

## 2020-06-15 ENCOUNTER — LABORATORY RESULT (OUTPATIENT)
Age: 50
End: 2020-06-15

## 2020-06-15 VITALS
SYSTOLIC BLOOD PRESSURE: 144 MMHG | HEIGHT: 60 IN | WEIGHT: 131 LBS | BODY MASS INDEX: 25.72 KG/M2 | TEMPERATURE: 98.2 F | DIASTOLIC BLOOD PRESSURE: 84 MMHG | HEART RATE: 75 BPM

## 2020-06-15 DIAGNOSIS — Z00.00 ENCOUNTER FOR GENERAL ADULT MEDICAL EXAMINATION WITHOUT ABNORMAL FINDINGS: ICD-10-CM

## 2020-06-15 PROCEDURE — 99213 OFFICE O/P EST LOW 20 MIN: CPT | Mod: GC

## 2020-06-15 RX ORDER — METRONIDAZOLE 500 MG/1
500 TABLET ORAL TWICE DAILY
Qty: 14 | Refills: 0 | Status: DISCONTINUED | COMMUNITY
Start: 2020-06-04 | End: 2020-06-15

## 2020-06-15 NOTE — PHYSICAL EXAM
[No JVD] : no jugular venous distention [PERRL] : pupils equal round and reactive to light [No Acute Distress] : no acute distress [Normal Rate] : normal rate  [Regular Rhythm] : with a regular rhythm [No Respiratory Distress] : no respiratory distress  [No Joint Swelling] : no joint swelling [No CVA Tenderness] : no CVA  tenderness [Normal S1, S2] : normal S1 and S2 [No Rash] : no rash

## 2020-06-18 NOTE — HISTORY OF PRESENT ILLNESS
[de-identified] : 49 year female PMH fibroadenoma and cervical polyp presents for a HCM visit. Patient states she had symptoms of COVID-19 in early April 2020, including fevers, chills, and aches. Patient did not require hospitalization. Her  and son tested positive for COVID-19 and had symptoms;  came to ED for COVID-19 symptoms, but was not admitted. Patient's , son and patient herself all recovered from their symptoms without any apparent complications, but patient is curious about her COVID-19 status as she was never tested. \par \par Apart from the symptoms the patient experienced in April, her interval health has been stable. She follows with OB-GYN, does not take any medications at home. she does endorse recent weight gain ("a few pounds") and states she eats more than before. She has polydypsia and polyuria as well.  [FreeTextEntry1] : follow up

## 2020-06-18 NOTE — PLAN
[FreeTextEntry1] : 49 year old female PMH cervical polyps and fibroadenomas presents for HCM visit \par \par #HCM\par -as patient is having symptoms concerning for DM, repeat A1c. Check TSH as well for recent weight gain\par -encourage weight loss \par \par #Possible COVID-19- patient had symptoms in early April 2020,  tested positive\par -no active symptoms or complications\par -COVID-19 IgG antibody as patient wants to know if she had the virus \par \par #Cervical Polyps and Fibroadenomas\par -follow up with OB/GYN\par \par

## 2020-06-18 NOTE — REVIEW OF SYSTEMS
[Recent Change In Weight] : ~T recent weight change [Constipation] : constipation [Nocturia] : nocturia [Joint Pain] : joint pain [Frequency] : frequency [Negative] : Eyes [Joint Stiffness] : joint stiffness [Dizziness] : dizziness [Fever] : no fever [Chills] : no chills [Night Sweats] : no night sweats [Chest Pain] : no chest pain [Shortness Of Breath] : no shortness of breath [Wheezing] : no wheezing [Cough] : no cough [Abdominal Pain] : no abdominal pain [Dysuria] : no dysuria [Fainting] : no fainting [FreeTextEntry9] : sometimes muscle stiffness in shoulders b/l [de-identified] : endorse occasional dizziness in the AM

## 2020-06-22 LAB
25(OH)D3 SERPL-MCNC: 24 NG/ML
ALBUMIN SERPL ELPH-MCNC: 4.5 G/DL
ALP BLD-CCNC: 67 U/L
ALT SERPL-CCNC: 136 U/L
ANION GAP SERPL CALC-SCNC: 14 MMOL/L
AST SERPL-CCNC: 74 U/L
BASOPHILS # BLD AUTO: 0.06 K/UL
BASOPHILS NFR BLD AUTO: 0.7 %
BILIRUB SERPL-MCNC: 0.4 MG/DL
BUN SERPL-MCNC: 14 MG/DL
CALCIUM SERPL-MCNC: 10 MG/DL
CHLORIDE SERPL-SCNC: 99 MMOL/L
CHOLEST SERPL-MCNC: 165 MG/DL
CHOLEST/HDLC SERPL: 3.6 RATIO
CO2 SERPL-SCNC: 24 MMOL/L
CREAT SERPL-MCNC: 0.5 MG/DL
EOSINOPHIL # BLD AUTO: 0.18 K/UL
EOSINOPHIL NFR BLD AUTO: 2.1 %
GLUCOSE SERPL-MCNC: 90 MG/DL
HCT VFR BLD CALC: 42.3 %
HDLC SERPL-MCNC: 46 MG/DL
HGB BLD-MCNC: 13.6 G/DL
IMM GRANULOCYTES NFR BLD AUTO: 0.2 %
LDLC SERPL CALC-MCNC: 100 MG/DL
LYMPHOCYTES # BLD AUTO: 3.13 K/UL
LYMPHOCYTES NFR BLD AUTO: 37.3 %
MAN DIFF?: NORMAL
MCHC RBC-ENTMCNC: 28.3 PG
MCHC RBC-ENTMCNC: 32.2 G/DL
MCV RBC AUTO: 88.1 FL
MONOCYTES # BLD AUTO: 0.84 K/UL
MONOCYTES NFR BLD AUTO: 10 %
NEUTROPHILS # BLD AUTO: 4.17 K/UL
NEUTROPHILS NFR BLD AUTO: 49.7 %
PLATELET # BLD AUTO: 318 K/UL
POTASSIUM SERPL-SCNC: 4 MMOL/L
PROT SERPL-MCNC: 7.6 G/DL
RBC # BLD: 4.8 M/UL
RBC # FLD: 15.3 %
SARS-COV-2 IGG SERPL IA-ACNC: 4.03 INDEX
SARS-COV-2 IGG SERPL QL IA: POSITIVE
SODIUM SERPL-SCNC: 137 MMOL/L
TRIGL SERPL-MCNC: 122 MG/DL
TSH SERPL-ACNC: 4.73 UIU/ML
WBC # FLD AUTO: 8.4 K/UL

## 2020-06-25 PROCEDURE — ZZZZZ: CPT

## 2020-06-25 NOTE — ASSESSMENT
[FreeTextEntry1] : 47 yo female patient with no pertinent past medical history, pesents to the clinic for regular follow up.\par \par 1- Healthcare maintenance\par Needs annual blood work\par Uptodate with mammogram\par Uptodate with GYN\par Refused flu shot\par \par 2- Vitamin D deficiency\par Will give vitamin D 2000 daily\par \par 3- Prediabetes\par Repeat Hba1c in 6m\par Diet management, decrease carbs Consent: The patient's consent was obtained including but not limited to risks of crusting, scabbing, blistering, scarring, darker or lighter pigmentary change, recurrence, incomplete removal and infection. Render Post-Care Instructions In Note?: yes Render Note In Bullet Format When Appropriate: No Detail Level: Simple Post-Care Instructions: I reviewed with the patient in detail post-care instructions. Patient is to wear sunprotection, and avoid picking at any of the treated lesions. Pt may apply Vaseline to crusted or scabbing areas. Duration Of Freeze Thaw-Cycle (Seconds): 3 Number Of Freeze-Thaw Cycles: 1 freeze-thaw cycle

## 2020-08-18 ENCOUNTER — APPOINTMENT (OUTPATIENT)
Dept: INTERNAL MEDICINE | Facility: CLINIC | Age: 50
End: 2020-08-18

## 2020-09-15 ENCOUNTER — APPOINTMENT (OUTPATIENT)
Dept: NUTRITION | Facility: CLINIC | Age: 50
End: 2020-09-15
Payer: MEDICAID

## 2020-09-15 ENCOUNTER — OUTPATIENT (OUTPATIENT)
Dept: OUTPATIENT SERVICES | Facility: HOSPITAL | Age: 50
LOS: 1 days | Discharge: HOME | End: 2020-09-15

## 2020-09-15 ENCOUNTER — APPOINTMENT (OUTPATIENT)
Dept: INTERNAL MEDICINE | Facility: CLINIC | Age: 50
End: 2020-09-15
Payer: MEDICAID

## 2020-09-15 VITALS
BODY MASS INDEX: 24.35 KG/M2 | HEART RATE: 76 BPM | SYSTOLIC BLOOD PRESSURE: 118 MMHG | HEIGHT: 60 IN | TEMPERATURE: 97 F | WEIGHT: 124 LBS | DIASTOLIC BLOOD PRESSURE: 80 MMHG

## 2020-09-15 DIAGNOSIS — E88.81 METABOLIC SYNDROME AND OTHER INSULIN RESISTANCE: ICD-10-CM

## 2020-09-15 PROCEDURE — 99213 OFFICE O/P EST LOW 20 MIN: CPT | Mod: GC

## 2020-09-15 NOTE — PHYSICAL EXAM
[No Acute Distress] : no acute distress [Well Nourished] : well nourished [No Respiratory Distress] : no respiratory distress  [Well-Appearing] : well-appearing [Clear to Auscultation] : lungs were clear to auscultation bilaterally [Normal Rate] : normal rate  [Regular Rhythm] : with a regular rhythm [Normal S1, S2] : normal S1 and S2 [No Murmur] : no murmur heard [No Edema] : there was no peripheral edema [Soft] : abdomen soft [Non Tender] : non-tender [Normal Bowel Sounds] : normal bowel sounds [No Rash] : no rash [No Focal Deficits] : no focal deficits

## 2020-09-16 LAB — HBV SURFACE AG SER QL: NONREACTIVE

## 2020-09-18 DIAGNOSIS — R74.0 NONSPECIFIC ELEVATION OF LEVELS OF TRANSAMINASE AND LACTIC ACID DEHYDROGENASE [LDH]: ICD-10-CM

## 2020-09-18 DIAGNOSIS — E88.81 METABOLIC SYNDROME AND OTHER INSULIN RESISTANCE: ICD-10-CM

## 2020-09-18 DIAGNOSIS — Z02.9 ENCOUNTER FOR ADMINISTRATIVE EXAMINATIONS, UNSPECIFIED: ICD-10-CM

## 2020-09-18 DIAGNOSIS — Z00.00 ENCOUNTER FOR GENERAL ADULT MEDICAL EXAMINATION WITHOUT ABNORMAL FINDINGS: ICD-10-CM

## 2020-09-19 LAB
ALBUMIN SERPL ELPH-MCNC: 4.5 G/DL
ALP BLD-CCNC: 74 U/L
ALT SERPL-CCNC: 29 U/L
ANION GAP SERPL CALC-SCNC: 12 MMOL/L
AST SERPL-CCNC: 25 U/L
BILIRUB SERPL-MCNC: 0.3 MG/DL
BUN SERPL-MCNC: 10 MG/DL
CALCIUM SERPL-MCNC: 10.3 MG/DL
CHLORIDE SERPL-SCNC: 102 MMOL/L
CO2 SERPL-SCNC: 24 MMOL/L
CREAT SERPL-MCNC: 0.5 MG/DL
GLUCOSE SERPL-MCNC: 69 MG/DL
HBV CORE IGM SER QL: NONREACTIVE
HBV SURFACE AB SERPL IA-ACNC: <3 MIU/ML
HCV RNA SERPL NAA DL=5-ACNC: NOT DETECTED IU/ML
HCV RNA SERPL NAA+PROBE-LOG IU: NOT DETECTED LOG10IU/ML
POTASSIUM SERPL-SCNC: 4.6 MMOL/L
PROT SERPL-MCNC: 7.5 G/DL
SMOOTH MUSCLE AB SER QL IF: NORMAL
SODIUM SERPL-SCNC: 138 MMOL/L

## 2020-09-19 NOTE — ASSESSMENT
[FreeTextEntry1] : 49 year old female PMH cervical polyps and fibroadenomas presents for HCM visit \par \par #HCM\par -no complaints, reports to be in good physical health\par -endorses increased exercise, improved diet'\par - refused influenza vaccination\par -UTD mammogram, pap\par -RTC in 6 months\par \par #Worsening transaminitis\par - AST 74, \par - denies alcohol use, no offending rx idenitified on questioning\par - Hep C RNA, Hep B Ab/Ag, smooth muscle AB, CMP drawn in office\par - denies abdominal pain, n/v, jaundice, ascites, rashes\par \par #COVID-19 AB+\par -no active symptoms or complications,  confirmed test +\par \par #Cervical Polyps and Fibroadenomas\par -actively follow up with OB/GYN\par

## 2020-09-19 NOTE — REVIEW OF SYSTEMS
[Fever] : no fever [Night Sweats] : no night sweats [Discharge] : no discharge [Vision Problems] : no vision problems [Earache] : no earache [Nasal Discharge] : no nasal discharge [Chest Pain] : no chest pain [Palpitations] : no palpitations [Shortness Of Breath] : no shortness of breath [Cough] : no cough [Abdominal Pain] : no abdominal pain [Vomiting] : no vomiting [Dysuria] : no dysuria [Hematuria] : no hematuria [Joint Pain] : no joint pain [Muscle Pain] : no muscle pain [Itching] : no itching [Skin Rash] : no skin rash [Headache] : no headache [Dizziness] : no dizziness

## 2020-09-19 NOTE — HISTORY OF PRESENT ILLNESS
[FreeTextEntry1] : Follow-up [de-identified] : 49 year female w/ PMH fibroadenoma and cervical polyp presents for a HCM visit. Pt endorses increasing her daily exercise and improving her diet ( abstaining from sugary, fatty foods) in an attempt to live a healthier lifestyle. Pt reports no complaints today.

## 2020-10-30 ENCOUNTER — OUTPATIENT (OUTPATIENT)
Dept: OUTPATIENT SERVICES | Facility: HOSPITAL | Age: 50
LOS: 1 days | Discharge: HOME | End: 2020-10-30

## 2020-12-21 ENCOUNTER — OUTPATIENT (OUTPATIENT)
Dept: OUTPATIENT SERVICES | Facility: HOSPITAL | Age: 50
LOS: 1 days | Discharge: HOME | End: 2020-12-21

## 2020-12-21 ENCOUNTER — APPOINTMENT (OUTPATIENT)
Dept: PODIATRY | Facility: CLINIC | Age: 50
End: 2020-12-21
Payer: MEDICAID

## 2020-12-21 DIAGNOSIS — B35.1 TINEA UNGUIUM: ICD-10-CM

## 2020-12-21 PROBLEM — Z87.09 HISTORY OF SORE THROAT: Status: RESOLVED | Noted: 2019-07-30 | Resolved: 2020-12-21

## 2020-12-21 PROCEDURE — 99203 OFFICE O/P NEW LOW 30 MIN: CPT

## 2020-12-28 ENCOUNTER — APPOINTMENT (OUTPATIENT)
Dept: INTERNAL MEDICINE | Facility: CLINIC | Age: 50
End: 2020-12-28
Payer: MEDICAID

## 2020-12-28 ENCOUNTER — OUTPATIENT (OUTPATIENT)
Dept: OUTPATIENT SERVICES | Facility: HOSPITAL | Age: 50
LOS: 1 days | Discharge: HOME | End: 2020-12-28

## 2020-12-28 ENCOUNTER — APPOINTMENT (OUTPATIENT)
Dept: NUTRITION | Facility: CLINIC | Age: 50
End: 2020-12-28
Payer: MEDICAID

## 2020-12-28 VITALS
WEIGHT: 121 LBS | DIASTOLIC BLOOD PRESSURE: 96 MMHG | TEMPERATURE: 99.1 F | BODY MASS INDEX: 23.75 KG/M2 | OXYGEN SATURATION: 97 % | SYSTOLIC BLOOD PRESSURE: 133 MMHG | HEART RATE: 80 BPM | HEIGHT: 60 IN

## 2020-12-28 DIAGNOSIS — R74.01 ELEVATION OF LEVELS OF LIVER TRANSAMINASE LEVELS: ICD-10-CM

## 2020-12-28 DIAGNOSIS — M54.9 DORSALGIA, UNSPECIFIED: ICD-10-CM

## 2020-12-28 DIAGNOSIS — R73.03 PREDIABETES: ICD-10-CM

## 2020-12-28 PROCEDURE — 99213 OFFICE O/P EST LOW 20 MIN: CPT | Mod: GC

## 2020-12-30 NOTE — PHYSICAL EXAM
[General Appearance - Alert] : alert [General Appearance - In No Acute Distress] : in no acute distress [Ankle Swelling (On Exam)] : not present [Varicose Veins Of Lower Extremities] : not present [Delayed in the Right Toes] : capillary refills normal in right toes [Delayed in the Left Toes] : capillary refills normal in the left toes [2+] : left foot dorsalis pedis 2+ [No Joint Swelling] : no joint swelling [Normal Foot/Ankle] : Both lower extremities were exposed and visualized. Standing exam demonstrates normal foot posture and alignment. Hindfoot exam shows no hindfoot valgus or varus [Skin Color & Pigmentation] : normal skin color and pigmentation [Skin Turgor] : normal skin turgor [] : no rash [Skin Lesions] : no skin lesions [Foot Ulcer] : no foot ulcer [Skin Induration] : no skin induration [Right Foot Was Examined] : The right foot was examined [Left Foot Was Examined] : The left foot was examined [Normal in Appearance] : normal in appearance [Normal] : normal [Full ROM] : with full range of motion [FreeTextEntry1] : Patient complains of thickened painful elongated brittle nails x 10\par \par \par

## 2020-12-30 NOTE — HISTORY OF PRESENT ILLNESS
[FreeTextEntry1] : LEIGH SIMEON   presents on 12/21/2020  for a foot examination, patient was referred by PCP. Patient complains of Thickened elong elongated nails .    Patient denies NVFC and denies SOB.\par

## 2020-12-30 NOTE — PROCEDURE
[FreeTextEntry1] : Patient examined, history and chart reviewed\par rx clotrimazole to be applied as discussed in detail to b/l feet due to fungal infection\par Follow up in 3 months or PRN\par \par

## 2020-12-31 DIAGNOSIS — M79.671 PAIN IN RIGHT FOOT: ICD-10-CM

## 2020-12-31 DIAGNOSIS — B35.1 TINEA UNGUIUM: ICD-10-CM

## 2020-12-31 DIAGNOSIS — M79.672 PAIN IN LEFT FOOT: ICD-10-CM

## 2020-12-31 NOTE — ASSESSMENT
[FreeTextEntry1] : 50 year female w/ PMH fibroadenoma and cervical polyp presents for a HCM visit and follow up of labs. She has left paraspinal pain/tenderness.\par \par #Left paraspinal pain\par -Naproxen 500mg bid for now\par -no need for PT or xray\par \par ##HCM\par -no complaints, reports to be in good physical health\par -endorses increased exercise, improved diet'\par - refused influenza vaccination\par -UTD mammogram, pap\par -send cbc, cmp, a1c, vit d, tsh, lipid\par -RTC in 6 months\par \par

## 2020-12-31 NOTE — HISTORY OF PRESENT ILLNESS
[FreeTextEntry1] : follow up labs [de-identified] : 50 year female w/ PMH fibroadenoma and cervical polyp presents for a HCM visit. Patient is following up results of CLD workup and CMP from last visit. She denies any jaundice, abdominal pain, n/v, rashes, ascites. Her only complaint is that her son was running and jumped into her which hurt her left lower back. She says it hurts most when laying down, does not hinder her ambulating. She reports eating healthier diet since seeing nutritionist in September. No other complaints.

## 2020-12-31 NOTE — PHYSICAL EXAM
[Normal] : affect was normal and insight and judgment were intact [de-identified] : left lower back pain on palpation, no bruising

## 2021-03-03 ENCOUNTER — RESULT REVIEW (OUTPATIENT)
Age: 51
End: 2021-03-03

## 2021-03-03 ENCOUNTER — OUTPATIENT (OUTPATIENT)
Dept: OUTPATIENT SERVICES | Facility: HOSPITAL | Age: 51
LOS: 1 days | Discharge: HOME | End: 2021-03-03
Payer: MEDICAID

## 2021-03-03 DIAGNOSIS — Z12.31 ENCOUNTER FOR SCREENING MAMMOGRAM FOR MALIGNANT NEOPLASM OF BREAST: ICD-10-CM

## 2021-03-03 DIAGNOSIS — R92.2 INCONCLUSIVE MAMMOGRAM: ICD-10-CM

## 2021-03-03 PROCEDURE — 77067 SCR MAMMO BI INCL CAD: CPT | Mod: 26

## 2021-03-03 PROCEDURE — 76641 ULTRASOUND BREAST COMPLETE: CPT | Mod: 26,50

## 2021-03-03 PROCEDURE — 77063 BREAST TOMOSYNTHESIS BI: CPT | Mod: 26

## 2021-03-10 ENCOUNTER — APPOINTMENT (OUTPATIENT)
Dept: BREAST CENTER | Facility: CLINIC | Age: 51
End: 2021-03-10
Payer: MEDICAID

## 2021-03-10 VITALS
TEMPERATURE: 98.7 F | SYSTOLIC BLOOD PRESSURE: 128 MMHG | BODY MASS INDEX: 23.75 KG/M2 | WEIGHT: 121 LBS | DIASTOLIC BLOOD PRESSURE: 80 MMHG | HEIGHT: 60 IN

## 2021-03-10 PROCEDURE — 99213 OFFICE O/P EST LOW 20 MIN: CPT

## 2021-03-10 PROCEDURE — 99072 ADDL SUPL MATRL&STAF TM PHE: CPT

## 2021-03-10 NOTE — HISTORY OF PRESENT ILLNESS
[FreeTextEntry1] : Eladio is a 50F who presents as a 6 month follow up.  She was initially seen because of a left breast abscess for which she underwent a US guided drainage procedure without any further recurrence of the abscess. \par \par On her most recent imaging performed on 1/22/18, she was found to have 2 asymmetries in the L breast that effaced with spot compression and likely represented fibroglandular tissue.  She additionally underwent bilateral US which revealed the following: \par \par 1/22/18 -- bilateral breast US \par -R breast: @9:00, 2-3 cm from nipple, 2 x 1.3 x 0.8 cm irregular hypoechoic mass --> rec biopsy \par -R breast: @2-3:00, 4 cm from nipple, 1.1 x 1.1 x 0.4 cm circumscribed hypoechoic mass --> rec 6 month follow R breast US \par -L breast: @9:00, 5 cm from nipple, 1.3 x 1.1 x 0.6 cm irregular hypoechoic mass --> rec biopsy\par \par 1/30/18 -- bilateral US guided biopsies\par -R breast @9:00, 2-3 cm from nipple --> fibroadenoma \par -L breast @9:00, 5 cm from nipple --> fibroadenoma/PASH \par \par INTERVAL HISTORY: \par Eladio returns for a 12 month follow up.  \par \par She denies any breast related complaints at this time.  She denies any breast pain, no nipple discharge and no palpable lesions.  She denies any fevers or chills and has not had any recurrence of her left breast abscess.  Although she does have a residual skin lesion in the area of her prior abscess site. \par \par Since her last visit, she had a b/l screening mammogram and b/l breast US on 3/3/2021, which revealed  stability of three masses: in her right breast  @2-3N4, and in her left breast @2N5, as well as a stable mass in her right breast @9N2, measuring 1.1 x 1 x 0.5 cm deemed BIRADS 2.

## 2021-03-10 NOTE — ASSESSMENT
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  She additionally appears to have a hypertrophic scar in the area of her prior abscess site which we will continue to follow clinically. \par \par Since her last visit, she had a b/l screening mammogram and b/l breast US on 3/3/2021, which revealed  stability of three masses: in her right breast  @2-3N4, and in her left breast @2N5, as well as a stable mass in her right breast @9N2, measuring 1.1 x 1 x 0.5 cm deemed BIRADS 2.  \par \par Her next imaging will be a b/l mammogram and b/l US on 3/3/2022.  THis will be scheduled for her today. I will have her follow up in1 year after her repeat imaging for a CBE. \par \par Fibroadenomas are benign lesions without any malignant potential.  These are now classified as fibroproliferative lesions without atypia with a slightly increased relative risk compared to the reference population for breast cancer.  THey may increase or decrease in size and usually regress in post menopausal women. Indications for surgical excision include rapidly enlarging lesions and symptomatic lesions. At this time, neither lesion is symptomatic and thus surgical intervention is not required at this time. \par \par We also discussed dense breasts.  Increasing breast density has been found to increase ones risk of breast cancer, but at this time, there is no clear indication for additional imaging in this setting, as both US and MRI have not been found to improve survival.  One can consider bilateral screening US in this setting.  However, out of 1000 women screened, the use of routine US will only identify an additional 3-4 cancers.  The use of US was found to increase the likelihood of undergoing more imaging and more biopsies.  This was discussed with the patient as she does have dense breasts.  \par \par We discussed breast cysts. They are not pre-malignant nor do they have malignant potential and are hormonally influenced.  They may grow or shrink in size as well as resolve spontaneously and there is usually no intervention unless they are symptomatic.  In several large studies, patients with breast cysts and a positive family history had a higher relative risk of breast cancer (from 1.5 to 3).  She is asymptomatic from her right breast cyst so no intervention will be performed at this time.\par \par All of her questions were answered. She knows to call with any further questions or concerns.  \par \par PLAN: \par -b/l mammogram and b/l US (dense breast screening) for 3/3/2022, f/up after

## 2021-03-10 NOTE — PHYSICAL EXAM
[Normocephalic] : normocephalic [EOMI] : extra ocular movement intact [No Supraclavicular Adenopathy] : no supraclavicular adenopathy [No Cervical Adenopathy] : no cervical adenopathy [Examined in the supine and seated position] : examined in the supine and seated position [No dominant masses] : no dominant masses in right breast  [No dominant masses] : no dominant masses left breast [No Nipple Retraction] : no left nipple retraction [No Nipple Discharge] : no left nipple discharge [No Axillary Lymphadenopathy] : no left axillary lymphadenopathy [Soft] : abdomen soft [Not Tender] : non-tender [No Edema] : no edema [No Rashes] : no rashes [No Ulceration] : no ulceration [de-identified] : no suspicious masses palpated, however she does have nodularities present @9-10N2, @7-8N1 [de-identified] : hypertrophic scar present in lower inner quadrant, no other signs of abscess recurrence, no erythema or induration, fullness palpated in the UOQ.  no other discrete masses palpated. nodularity palpated @2N1-2

## 2021-03-10 NOTE — DATA REVIEWED
[FreeTextEntry1] : EXAM:  MG MAMMO SCREEN W WARNER BI#\par \par \par PROCEDURE DATE:  03/03/2021\par \par \par \par INTERPRETATION:  HISTORY:\par Bilateral MG MAMMO SCREEN W WARNER BI# was performed. Patient is 50 years old and is seen for screening. The patient has no personal history of cancer. The patient has a history of bilateral ultrasound guided biopsy - benign. The patient has no family history of breast cancer.\par \par RISK ASSESSMENT:\par NCI Lifetime Risk: 10.1\par Tyrer-Davidzick Lifetime Risk: 6.3\par \par CLINICAL BREAST EXAM:\par The patient reports her last clinical breast exam was performed over one year ago.\par \par COMPARISON STUDIES:\par The present examination has been compared to prior imaging studies performed at [facility] on 01/22/2018, 02/20/2019 and 02/24/2020.\par \par MAMMOGRAM FINDINGS:\par Mammography was performed including the following views: bilateral craniocaudal with tomosynthesis, bilateral mediolateral oblique with tomosynthesis.  The examination includes digital synthetic 2D and digital tomosynthesis 3D images. Additional imaging analysis was performed using CAD (computer-aided detection) software.\par \par The breasts are heterogeneously dense, which may obscure small masses.\par \par No suspicious mass, grouping of calcifications, or other abnormality is identified.\par \par IMPRESSION:\par No mammographic evidence of malignancy.\par \par RECOMMENDATION:\par Unless otherwise indicated by clinical findings, annual screening mammography recommended.\par \par ASSESSMENT:\par BI-RADS Category 1:  Negative\par \par The patient will be notified of these results by telephone, and will also be mailed a written summary in layman's terms.\par \par \par \par EXAM:  MG US BREAST COMPLETE BI\par \par \par PROCEDURE DATE:  03/03/2021\par \par \par \par INTERPRETATION:  Clinical History / Reason for exam: Screening bilateral breast ultrasound.\par \par The patient reports her last clinical breast examination was performed about twelve months ago.\par \par Family history: There is no family history of breast cancer.\par \par Breast composition: Heterogeneously dense, which may obscure small masses.\par \par Comparisons: Breast ultrasound dating 2018\par \par Findings:\par \par Ultrasound:\par \par Bilateral whole breast ultrasound was performed.\par \par Right breast:\par At 2-3 o'clock N4 there is redemonstration of stable oval circumscribed mass measuring 1.1 x 1.0 x 0.5 cm.\par At 9:00 N2 there is redemonstration of stable oval circumscribed biopsy proven benign mass measuring 1.6 x 1.2 x 0.7 cm.\par \par Left breast:\par At 2:00 N5 there is redemonstration of stable biopsy proven oval circumscribed mass measuring 1.2 x 1.1 x 0.5 cm.\par \par No other solid or cystic mass noted in either breast. No right or left axillary lymphadenopathy.\par \par Impression: No sonographic evidence of malignancy. Stable bilateral breast masses.\par \par Recommendation: Unless otherwise indicated by clinical findings, annual screening mammography recommended.\par \par BI-RADS Category 2: Benign\par \par \par \par \par \par \par \par \par PUMA CASTILLO DO; Attending Radiologist\par This document has been electronically signed. Mar  4 2021  1:\par \par

## 2021-03-10 NOTE — REVIEW OF SYSTEMS
[As Noted in HPI] : as noted in HPI [Negative] : Neurological [Fever] : no fever [Chills] : no chills [Feeling Poorly] : not feeling poorly [Feeling Tired] : not feeling tired [Skin Lesions] : no skin lesions [Breast Pain] : no breast pain [Breast Lump] : no breast lump [Hot Flashes] : no hot flashes

## 2021-03-17 ENCOUNTER — APPOINTMENT (OUTPATIENT)
Dept: OPHTHALMOLOGY | Facility: CLINIC | Age: 51
End: 2021-03-17

## 2021-03-17 ENCOUNTER — OUTPATIENT (OUTPATIENT)
Dept: OUTPATIENT SERVICES | Facility: HOSPITAL | Age: 51
LOS: 1 days | Discharge: HOME | End: 2021-03-17
Payer: MEDICAID

## 2021-03-17 PROCEDURE — 92132 CPTRZD OPH DX IMG ANT SGM: CPT | Mod: 26

## 2021-03-17 PROCEDURE — 92004 COMPRE OPH EXAM NEW PT 1/>: CPT

## 2021-03-17 PROCEDURE — 92202 OPSCPY EXTND ON/MAC DRAW: CPT

## 2021-03-29 ENCOUNTER — APPOINTMENT (OUTPATIENT)
Dept: PODIATRY | Facility: CLINIC | Age: 51
End: 2021-03-29

## 2021-04-01 DIAGNOSIS — H30.141: ICD-10-CM

## 2021-04-01 DIAGNOSIS — H52.4 PRESBYOPIA: ICD-10-CM

## 2021-04-01 DIAGNOSIS — D31.41 BENIGN NEOPLASM OF RIGHT CILIARY BODY: ICD-10-CM

## 2021-06-03 ENCOUNTER — OUTPATIENT (OUTPATIENT)
Dept: OUTPATIENT SERVICES | Facility: HOSPITAL | Age: 51
LOS: 1 days | Discharge: HOME | End: 2021-06-03

## 2021-06-03 ENCOUNTER — APPOINTMENT (OUTPATIENT)
Dept: OBGYN | Facility: CLINIC | Age: 51
End: 2021-06-03
Payer: MEDICAID

## 2021-06-03 VITALS
WEIGHT: 124 LBS | HEIGHT: 60 IN | BODY MASS INDEX: 24.35 KG/M2 | DIASTOLIC BLOOD PRESSURE: 60 MMHG | SYSTOLIC BLOOD PRESSURE: 114 MMHG

## 2021-06-03 DIAGNOSIS — N84.1 POLYP OF CERVIX UTERI: ICD-10-CM

## 2021-06-03 PROCEDURE — 99396 PREV VISIT EST AGE 40-64: CPT

## 2021-06-05 PROBLEM — N84.1 CERVICAL POLYP: Status: RESOLVED | Noted: 2019-05-23 | Resolved: 2021-06-05

## 2021-06-05 NOTE — HISTORY OF PRESENT ILLNESS
[FreeTextEntry1] : 50y P2 with LMP 5/28 presents for annual exam. \par \par Denies any complaints today. Has been following with Dr. Burnette from breast due to fibroadenomas, fibrocystic changes and h/o breast abscess. \par Has an appt with primary doctor on 6/23\par \par Periods have been regular q monthly lasting 4days. No hot flashes, vaginal dryness, mood swings. \par Sexually active with . Does not desire STI testing\par \par Last mammo March/2021 - BIRADS 1\par Last pap smear -  May 2020 NILM HPV neg

## 2021-06-05 NOTE — DISCUSSION/SUMMARY
[FreeTextEntry1] : 50y P2 with LMP 5/28 for annual\par \par Last pap smear 5/2020 NILM, HPV neg\par #hx of breast cysts/fibroadenoma, follows with Dr. Burnette\par 3/2021 Mammogram BIRADS1, ultrasound (dense breasts) BIRADS 2\par \par RTC 1year for annual

## 2021-06-16 ENCOUNTER — LABORATORY RESULT (OUTPATIENT)
Age: 51
End: 2021-06-16

## 2021-06-23 ENCOUNTER — APPOINTMENT (OUTPATIENT)
Dept: INTERNAL MEDICINE | Facility: CLINIC | Age: 51
End: 2021-06-23
Payer: MEDICAID

## 2021-06-23 ENCOUNTER — OUTPATIENT (OUTPATIENT)
Dept: OUTPATIENT SERVICES | Facility: HOSPITAL | Age: 51
LOS: 1 days | Discharge: HOME | End: 2021-06-23

## 2021-06-23 ENCOUNTER — NON-APPOINTMENT (OUTPATIENT)
Age: 51
End: 2021-06-23

## 2021-06-23 VITALS
BODY MASS INDEX: 24.35 KG/M2 | OXYGEN SATURATION: 98 % | SYSTOLIC BLOOD PRESSURE: 148 MMHG | WEIGHT: 124 LBS | HEART RATE: 92 BPM | HEIGHT: 60 IN | DIASTOLIC BLOOD PRESSURE: 88 MMHG

## 2021-06-23 LAB
25(OH)D3 SERPL-MCNC: 23 NG/ML
ALBUMIN SERPL ELPH-MCNC: 4.6 G/DL
ALP BLD-CCNC: 72 U/L
ALT SERPL-CCNC: 21 U/L
ANION GAP SERPL CALC-SCNC: 12 MMOL/L
AST SERPL-CCNC: 23 U/L
BASOPHILS # BLD AUTO: 0.06 K/UL
BASOPHILS NFR BLD AUTO: 0.8 %
BILIRUB SERPL-MCNC: 0.3 MG/DL
BUN SERPL-MCNC: 12 MG/DL
CALCIUM SERPL-MCNC: 10.7 MG/DL
CHLORIDE SERPL-SCNC: 101 MMOL/L
CHOLEST SERPL-MCNC: 189 MG/DL
CO2 SERPL-SCNC: 23 MMOL/L
CREAT SERPL-MCNC: 0.5 MG/DL
EOSINOPHIL # BLD AUTO: 0.15 K/UL
EOSINOPHIL NFR BLD AUTO: 1.9 %
ESTIMATED AVERAGE GLUCOSE: 117 MG/DL
GLUCOSE SERPL-MCNC: 85 MG/DL
HBA1C MFR BLD HPLC: 5.7 %
HCT VFR BLD CALC: 40.7 %
HDLC SERPL-MCNC: 49 MG/DL
HGB BLD-MCNC: 13.2 G/DL
IMM GRANULOCYTES NFR BLD AUTO: 0.4 %
LDLC SERPL CALC-MCNC: 119 MG/DL
LYMPHOCYTES # BLD AUTO: 2.84 K/UL
LYMPHOCYTES NFR BLD AUTO: 36.5 %
MAN DIFF?: NORMAL
MCHC RBC-ENTMCNC: 28 PG
MCHC RBC-ENTMCNC: 32.4 G/DL
MCV RBC AUTO: 86.4 FL
MONOCYTES # BLD AUTO: 0.78 K/UL
MONOCYTES NFR BLD AUTO: 10 %
NEUTROPHILS # BLD AUTO: 3.92 K/UL
NEUTROPHILS NFR BLD AUTO: 50.4 %
NONHDLC SERPL-MCNC: 140 MG/DL
PLATELET # BLD AUTO: 300 K/UL
POTASSIUM SERPL-SCNC: 4.3 MMOL/L
PROT SERPL-MCNC: 7.8 G/DL
RBC # BLD: 4.71 M/UL
RBC # FLD: 14 %
SODIUM SERPL-SCNC: 136 MMOL/L
TRIGL SERPL-MCNC: 170 MG/DL
TSH SERPL-ACNC: 4.55 UIU/ML
WBC # FLD AUTO: 7.78 K/UL

## 2021-06-23 PROCEDURE — 99213 OFFICE O/P EST LOW 20 MIN: CPT | Mod: GC

## 2021-06-24 DIAGNOSIS — Z00.00 ENCOUNTER FOR GENERAL ADULT MEDICAL EXAMINATION WITHOUT ABNORMAL FINDINGS: ICD-10-CM

## 2021-06-24 DIAGNOSIS — R73.03 PREDIABETES: ICD-10-CM

## 2021-06-24 DIAGNOSIS — S16.1XXA STRAIN OF MUSCLE, FASCIA AND TENDON AT NECK LEVEL, INITIAL ENCOUNTER: ICD-10-CM

## 2021-06-27 NOTE — HISTORY OF PRESENT ILLNESS
[FreeTextEntry1] : routine 6 months follow up [de-identified] : 50 year female w/ PMH fibroadenoma and cervical polyp presents for a 6 month routine follow up. patient complains of right shoulder and neck pain since 1 month. pain is crampy in nature, radiating to right upper arm, 7/10 in severity, non limiting, exacerbated by shoulder movement and head turning to the right side, mildly relieved with tylenol. she denied fever, chills, nausea/vomiting, abdominal pain, pain in other joints, trauma. she also mentionned a foot nodule? that she feels when she walks and that occasionally causes pain (as if she is stepping on a pebble)

## 2021-06-27 NOTE — PHYSICAL EXAM
[No Acute Distress] : no acute distress [No Respiratory Distress] : no respiratory distress  [Clear to Auscultation] : lungs were clear to auscultation bilaterally [Normal Rate] : normal rate  [Normal S1, S2] : normal S1 and S2 [Soft] : abdomen soft [Non Tender] : non-tender [Non-distended] : non-distended [de-identified] : pain upon palpation [de-identified] :  right foot nodule?

## 2021-06-27 NOTE — REVIEW OF SYSTEMS
[Muscle Pain] : muscle pain [Negative] : Gastrointestinal [Joint Pain] : no joint pain [FreeTextEntry9] : right neck-shoulder

## 2021-06-27 NOTE — ASSESSMENT
[FreeTextEntry1] : #prediabetes: Hba1c 5.7 from 5.9\par -patient counseled for dietary improvement\par \par #neck straain\par -NSAIDS and if no improvement -> physical therapy\par \par #foot nodule?\par - referral to podiatry \par \par #HCM\par -patient never had a colonoscopy -> ordered\par \par #hypercalcemia\par - upper limit of normal (10.7)\par -asymptomatic\par -will follow up \par \par

## 2021-06-28 ENCOUNTER — OUTPATIENT (OUTPATIENT)
Dept: OUTPATIENT SERVICES | Facility: HOSPITAL | Age: 51
LOS: 1 days | Discharge: HOME | End: 2021-06-28
Payer: MEDICAID

## 2021-06-28 ENCOUNTER — APPOINTMENT (OUTPATIENT)
Dept: OPHTHALMOLOGY | Facility: CLINIC | Age: 51
End: 2021-06-28

## 2021-06-28 DIAGNOSIS — D31.41 BENIGN NEOPLASM OF RIGHT CILIARY BODY: ICD-10-CM

## 2021-06-28 PROCEDURE — 92012 INTRM OPH EXAM EST PATIENT: CPT

## 2021-06-28 PROCEDURE — 92285 EXTERNAL OCULAR PHOTOGRAPHY: CPT | Mod: 26

## 2021-06-29 ENCOUNTER — APPOINTMENT (OUTPATIENT)
Dept: OPHTHALMOLOGY | Facility: CLINIC | Age: 51
End: 2021-06-29

## 2021-07-09 ENCOUNTER — APPOINTMENT (OUTPATIENT)
Dept: PODIATRY | Facility: CLINIC | Age: 51
End: 2021-07-09
Payer: MEDICAID

## 2021-07-09 ENCOUNTER — OUTPATIENT (OUTPATIENT)
Dept: OUTPATIENT SERVICES | Facility: HOSPITAL | Age: 51
LOS: 1 days | Discharge: HOME | End: 2021-07-09

## 2021-07-09 PROCEDURE — 99213 OFFICE O/P EST LOW 20 MIN: CPT

## 2021-07-29 NOTE — PHYSICAL EXAM
[2+] : left foot dorsalis pedis 2+ [Normal Foot/Ankle] : Both lower extremities were exposed and visualized. Standing exam demonstrates normal foot posture and alignment. Hindfoot exam shows no hindfoot valgus or varus [Skin Color & Pigmentation] : normal skin color and pigmentation [Skin Turgor] : normal skin turgor [] : no rash [de-identified] : nodule located on plantar right arch.

## 2021-07-29 NOTE — HISTORY OF PRESENT ILLNESS
[FreeTextEntry1] : 50 y.o female presents with pain on her plantar right foot. She states she feels like she is walkingon a pebble and says there is a ball in the are. She only has pain when she is walking in sandals. She had this problem for 2 months denying any injury.  Denies N, V, F, SOB, C, CP.

## 2021-07-29 NOTE — ASSESSMENT
[FreeTextEntry1] : 50 F presents with plantar fibroma on right. \par - Rx: US of right foot\par - Pt. explained that she does not want injection for the fibroma. \par - Pt. told that condition is most likely related to fibroma, she was encouraged to wear better shoes with inserts since she only has pain when she wears sandals. \par - Pt. told other possible differential diagnosis. \par - She was educated on the high recurrence rate of surgical intervention of a fibroma. \par

## 2021-08-02 ENCOUNTER — RESULT REVIEW (OUTPATIENT)
Age: 51
End: 2021-08-02

## 2021-08-02 ENCOUNTER — OUTPATIENT (OUTPATIENT)
Dept: OUTPATIENT SERVICES | Facility: HOSPITAL | Age: 51
LOS: 1 days | Discharge: HOME | End: 2021-08-02
Payer: MEDICAID

## 2021-08-02 DIAGNOSIS — M72.2 PLANTAR FASCIAL FIBROMATOSIS: ICD-10-CM

## 2021-08-02 DIAGNOSIS — D21.9 BENIGN NEOPLASM OF CONNECTIVE AND OTHER SOFT TISSUE, UNSPECIFIED: ICD-10-CM

## 2021-08-02 PROCEDURE — 76882 US LMTD JT/FCL EVL NVASC XTR: CPT | Mod: 26,RT

## 2021-08-09 ENCOUNTER — APPOINTMENT (OUTPATIENT)
Dept: PODIATRY | Facility: CLINIC | Age: 51
End: 2021-08-09

## 2021-08-09 ENCOUNTER — OUTPATIENT (OUTPATIENT)
Dept: OUTPATIENT SERVICES | Facility: HOSPITAL | Age: 51
LOS: 1 days | Discharge: HOME | End: 2021-08-09

## 2021-08-09 ENCOUNTER — APPOINTMENT (OUTPATIENT)
Dept: PODIATRY | Facility: CLINIC | Age: 51
End: 2021-08-09
Payer: MEDICAID

## 2021-08-09 PROCEDURE — 99213 OFFICE O/P EST LOW 20 MIN: CPT

## 2021-08-29 NOTE — ASSESSMENT
[FreeTextEntry1] : 50 F presents with plantar fibroma on right. \par Discussed Radiologist findings with Patient in Detail \par Patient made aware of all conditions and is in agreement with follow up treatment plan \par - She was educated on the high recurrence rate of surgical intervention of a fibroma. \par follow up as needed \par

## 2021-08-29 NOTE — PHYSICAL EXAM
[2+] : left foot dorsalis pedis 2+ [Normal Foot/Ankle] : Both lower extremities were exposed and visualized. Standing exam demonstrates normal foot posture and alignment. Hindfoot exam shows no hindfoot valgus or varus [de-identified] : nodule located on plantar right arch.  [Skin Color & Pigmentation] : normal skin color and pigmentation [Skin Turgor] : normal skin turgor [] : no rash

## 2021-08-31 ENCOUNTER — NON-APPOINTMENT (OUTPATIENT)
Age: 51
End: 2021-08-31

## 2021-09-01 ENCOUNTER — OUTPATIENT (OUTPATIENT)
Dept: OUTPATIENT SERVICES | Facility: HOSPITAL | Age: 51
LOS: 1 days | Discharge: HOME | End: 2021-09-01

## 2021-09-08 ENCOUNTER — OUTPATIENT (OUTPATIENT)
Dept: OUTPATIENT SERVICES | Facility: HOSPITAL | Age: 51
LOS: 1 days | Discharge: HOME | End: 2021-09-08

## 2021-09-08 ENCOUNTER — APPOINTMENT (OUTPATIENT)
Dept: PODIATRY | Facility: CLINIC | Age: 51
End: 2021-09-08
Payer: MEDICAID

## 2021-09-08 PROCEDURE — 99213 OFFICE O/P EST LOW 20 MIN: CPT

## 2021-09-08 NOTE — PHYSICAL EXAM
[2+] : left foot dorsalis pedis 2+ [Normal Foot/Ankle] : Both lower extremities were exposed and visualized. Standing exam demonstrates normal foot posture and alignment. Hindfoot exam shows no hindfoot valgus or varus [de-identified] : nodule located on plantar right arch.  [Skin Color & Pigmentation] : normal skin color and pigmentation [Skin Turgor] : normal skin turgor [] : no rash

## 2021-09-08 NOTE — ASSESSMENT
[FreeTextEntry1] : 50 F presents with plantar fibroma on right. \par Discussed Radiologist findings with Patient in Detail \par Patient made aware of all conditions and is in agreement with follow up treatment plan \par - She was educated on the high recurrence rate of surgical intervention of a fibroma. \par - Fibroma is now resolved \par

## 2021-12-20 ENCOUNTER — OUTPATIENT (OUTPATIENT)
Dept: OUTPATIENT SERVICES | Facility: HOSPITAL | Age: 51
LOS: 1 days | Discharge: HOME | End: 2021-12-20

## 2021-12-20 ENCOUNTER — APPOINTMENT (OUTPATIENT)
Dept: OPHTHALMOLOGY | Facility: CLINIC | Age: 51
End: 2021-12-20
Payer: MEDICAID

## 2021-12-20 PROCEDURE — ZZZZZ: CPT

## 2021-12-22 ENCOUNTER — APPOINTMENT (OUTPATIENT)
Dept: INTERNAL MEDICINE | Facility: CLINIC | Age: 51
End: 2021-12-22
Payer: MEDICAID

## 2021-12-22 ENCOUNTER — OUTPATIENT (OUTPATIENT)
Dept: OUTPATIENT SERVICES | Facility: HOSPITAL | Age: 51
LOS: 1 days | Discharge: HOME | End: 2021-12-22

## 2021-12-22 ENCOUNTER — NON-APPOINTMENT (OUTPATIENT)
Age: 51
End: 2021-12-22

## 2021-12-22 VITALS
HEIGHT: 60 IN | BODY MASS INDEX: 24.94 KG/M2 | DIASTOLIC BLOOD PRESSURE: 74 MMHG | HEART RATE: 79 BPM | WEIGHT: 127 LBS | TEMPERATURE: 98.7 F | OXYGEN SATURATION: 97 % | SYSTOLIC BLOOD PRESSURE: 116 MMHG

## 2021-12-22 LAB
25(OH)D3 SERPL-MCNC: 15 NG/ML
ALBUMIN SERPL ELPH-MCNC: 4.4 G/DL
ALP BLD-CCNC: 68 U/L
ALT SERPL-CCNC: 20 U/L
ANION GAP SERPL CALC-SCNC: 13 MMOL/L
AST SERPL-CCNC: 18 U/L
BASOPHILS # BLD AUTO: 0.05 K/UL
BASOPHILS NFR BLD AUTO: 0.5 %
BILIRUB SERPL-MCNC: 0.3 MG/DL
BUN SERPL-MCNC: 12 MG/DL
CALCIUM SERPL-MCNC: 10.1 MG/DL
CHLORIDE SERPL-SCNC: 101 MMOL/L
CHOLEST SERPL-MCNC: 137 MG/DL
CO2 SERPL-SCNC: 22 MMOL/L
CREAT SERPL-MCNC: <0.5 MG/DL
EOSINOPHIL # BLD AUTO: 0.22 K/UL
EOSINOPHIL NFR BLD AUTO: 2 %
ESTIMATED AVERAGE GLUCOSE: 120 MG/DL
GLUCOSE SERPL-MCNC: 89 MG/DL
HBA1C MFR BLD HPLC: 5.8 %
HCT VFR BLD CALC: 40.1 %
HDLC SERPL-MCNC: 41 MG/DL
HGB BLD-MCNC: 13 G/DL
IMM GRANULOCYTES NFR BLD AUTO: 0.5 %
LDLC SERPL CALC-MCNC: 81 MG/DL
LYMPHOCYTES # BLD AUTO: 2.75 K/UL
LYMPHOCYTES NFR BLD AUTO: 25.1 %
MAN DIFF?: NORMAL
MCHC RBC-ENTMCNC: 28.9 PG
MCHC RBC-ENTMCNC: 32.4 G/DL
MCV RBC AUTO: 89.1 FL
MONOCYTES # BLD AUTO: 1.02 K/UL
MONOCYTES NFR BLD AUTO: 9.3 %
NEUTROPHILS # BLD AUTO: 6.86 K/UL
NEUTROPHILS NFR BLD AUTO: 62.6 %
NONHDLC SERPL-MCNC: 96 MG/DL
PLATELET # BLD AUTO: 287 K/UL
POTASSIUM SERPL-SCNC: 3.9 MMOL/L
PROT SERPL-MCNC: 7 G/DL
RBC # BLD: 4.5 M/UL
RBC # FLD: 13.2 %
SODIUM SERPL-SCNC: 136 MMOL/L
TRIGL SERPL-MCNC: 129 MG/DL
TSH SERPL-ACNC: 2.94 UIU/ML
WBC # FLD AUTO: 10.95 K/UL

## 2021-12-22 PROCEDURE — 99213 OFFICE O/P EST LOW 20 MIN: CPT | Mod: GC

## 2021-12-22 NOTE — PHYSICAL EXAM
[No Acute Distress] : no acute distress [Well Nourished] : well nourished [No Respiratory Distress] : no respiratory distress  [No Accessory Muscle Use] : no accessory muscle use [Clear to Auscultation] : lungs were clear to auscultation bilaterally [Normal Rate] : normal rate  [Regular Rhythm] : with a regular rhythm [No Edema] : there was no peripheral edema [Soft] : abdomen soft [Non Tender] : non-tender [No HSM] : no HSM [No CVA Tenderness] : no CVA  tenderness [No Joint Swelling] : no joint swelling

## 2021-12-30 DIAGNOSIS — Z00.00 ENCOUNTER FOR GENERAL ADULT MEDICAL EXAMINATION WITHOUT ABNORMAL FINDINGS: ICD-10-CM

## 2021-12-30 DIAGNOSIS — E55.9 VITAMIN D DEFICIENCY, UNSPECIFIED: ICD-10-CM

## 2021-12-30 DIAGNOSIS — R73.03 PREDIABETES: ICD-10-CM

## 2022-01-03 NOTE — HISTORY OF PRESENT ILLNESS
[de-identified] : 51 year female w/ PMH fibroadenoma and cervical polyp presents for a 6 month routine follow up. Pt states that  her Right shoulder pain is now improved. Her "foot nodule" is now resolved.  She denied fever, chills, nausea/vomiting, abdominal pain, pain in other joints, trauma.

## 2022-01-03 NOTE — REVIEW OF SYSTEMS
[Fever] : no fever [Chills] : no chills [Chest Pain] : no chest pain [Palpitations] : no palpitations [Lower Ext Edema] : no lower extremity edema [Shortness Of Breath] : no shortness of breath [Wheezing] : no wheezing [Cough] : no cough [Abdominal Pain] : no abdominal pain [Nausea] : no nausea [Vomiting] : no vomiting [Joint Pain] : no joint pain [Muscle Pain] : no muscle pain [Back Pain] : no back pain [Headache] : no headache

## 2022-03-04 ENCOUNTER — RESULT REVIEW (OUTPATIENT)
Age: 52
End: 2022-03-04

## 2022-03-04 ENCOUNTER — OUTPATIENT (OUTPATIENT)
Dept: OUTPATIENT SERVICES | Facility: HOSPITAL | Age: 52
LOS: 1 days | Discharge: HOME | End: 2022-03-04
Payer: COMMERCIAL

## 2022-03-04 DIAGNOSIS — Z12.31 ENCOUNTER FOR SCREENING MAMMOGRAM FOR MALIGNANT NEOPLASM OF BREAST: ICD-10-CM

## 2022-03-04 DIAGNOSIS — R92.2 INCONCLUSIVE MAMMOGRAM: ICD-10-CM

## 2022-03-04 PROCEDURE — 77063 BREAST TOMOSYNTHESIS BI: CPT | Mod: 26

## 2022-03-04 PROCEDURE — 76641 ULTRASOUND BREAST COMPLETE: CPT | Mod: 26,50

## 2022-03-04 PROCEDURE — 77067 SCR MAMMO BI INCL CAD: CPT | Mod: 26

## 2022-03-10 ENCOUNTER — NON-APPOINTMENT (OUTPATIENT)
Age: 52
End: 2022-03-10

## 2022-03-10 ENCOUNTER — APPOINTMENT (OUTPATIENT)
Dept: BREAST CENTER | Facility: CLINIC | Age: 52
End: 2022-03-10
Payer: MEDICAID

## 2022-03-10 DIAGNOSIS — R92.2 INCONCLUSIVE MAMMOGRAM: ICD-10-CM

## 2022-03-10 DIAGNOSIS — D24.1 BENIGN NEOPLASM OF RIGHT BREAST: ICD-10-CM

## 2022-03-10 DIAGNOSIS — N60.19 DIFFUSE CYSTIC MASTOPATHY OF UNSPECIFIED BREAST: ICD-10-CM

## 2022-03-10 DIAGNOSIS — D24.2 BENIGN NEOPLASM OF LEFT BREAST: ICD-10-CM

## 2022-03-10 PROCEDURE — 99212 OFFICE O/P EST SF 10 MIN: CPT

## 2022-03-10 NOTE — ASSESSMENT
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  She additionally appears to have a hypertrophic scar in the area of her prior abscess site which we will continue to follow clinically. \par \par Her imaging is as follows:\par 03/04/2022 b/l mammo and US\par -breasts are heterogeneously dense\par -stable asymmetries seen in both breasts\par BI-RADS 2\par \par RIGHT US:\par -@ 2 to 3N 4 stable hypoechoic mass measuring 1.1 x 1.0 x 0.5 cm, benign.\par -@9N 2  biopsy benign mass measuring 1.6 x 1.2 x 0.6 cm.\par \par LEFT US:\par -@2N5 stable biopsied benign mass measuring 1.2 x 1.1 x 0.5 cm.\par BI-RADS2\par \par \par Her next imaging will be a b/l mammogram and b/l US on 3/3/2023.  THis will be scheduled for her today. I will have her follow up in1 year after her repeat imaging for a CBE. \par \par Fibroadenomas are benign lesions without any malignant potential.  These are now classified as fibroproliferative lesions without atypia with a slightly increased relative risk compared to the reference population for breast cancer.  THey may increase or decrease in size and usually regress in post menopausal women. Indications for surgical excision include rapidly enlarging lesions and symptomatic lesions. At this time, neither lesion is symptomatic and thus surgical intervention is not required at this time. \par \par We also discussed dense breasts.  Increasing breast density has been found to increase ones risk of breast cancer, but at this time, there is no clear indication for additional imaging in this setting, as both US and MRI have not been found to improve survival.  One can consider bilateral screening US in this setting.  However, out of 1000 women screened, the use of routine US will only identify an additional 3-4 cancers.  The use of US was found to increase the likelihood of undergoing more imaging and more biopsies.  This was discussed with the patient as she does have dense breasts.  \par \par We discussed breast cysts. They are not pre-malignant nor do they have malignant potential and are hormonally influenced.  They may grow or shrink in size as well as resolve spontaneously and there is usually no intervention unless they are symptomatic.  In several large studies, patients with breast cysts and a positive family history had a higher relative risk of breast cancer (from 1.5 to 3).  She is asymptomatic from her right breast cyst so no intervention will be performed at this time.\par \par All of her questions were answered. She knows to call with any further questions or concerns.  \par \par PLAN: \par -b/l mammogram and b/l US (dense breast screening) for 3/4/2023\par -follow up prn

## 2022-03-10 NOTE — DATA REVIEWED
[FreeTextEntry1] : EXAM:  MG MAMMO SCREEN W WARNER BI#\par \par \par PROCEDURE DATE:  03/04/2022\par \par \par \par INTERPRETATION:  HISTORY:\par Bilateral MG MAMMO SCREEN W WARNER BI# was performed. Patient is 51 years old and is seen for screening. The patient has no personal history of cancer. The patient has a history of bilateral ultrasound guided biopsy - benign. The patient has no family history of breast cancer.\par \par RISK ASSESSMENT:\par NCI Lifetime Risk: 11.1\par Tyrer-Davidzick Lifetime Risk: 5.2\par \par CLINICAL BREAST EXAM:\par The patient reports her last clinical breast exam was performed 6 months ago.\par \par COMPARISON STUDIES:\par The present examination has been compared to prior imaging studies performed at [facility] on 01/22/2018, 02/20/2019, 02/24/2020 and 03/03/2021.\par \par MAMMOGRAM FINDINGS:\par Mammography was performed including the following views: bilateral craniocaudal with tomosynthesis, bilateral mediolateral oblique with tomosynthesis.  The examination includes digital synthetic 2D and digital tomosynthesis 3D images. Additional imaging analysis was performed using CAD (computer-aided detection) software.\par \par The breasts are heterogeneously dense, which may obscure small masses.\par \par There are stable asymmetries seen in both breasts.\par \par No suspicious mass, grouping of calcifications, or other abnormality is identified.\par \par IMPRESSION:\par There is no mammographic evidence of malignancy.\par \par RECOMMENDATION:\par Unless otherwise indicated by clinical findings, annual screening mammography recommended.\par \par ASSESSMENT:\par BI-RADS Category 2:  Benign\par EXAM:  MG US BREAST COMPLETE BI\par \par \par PROCEDURE DATE:  03/04/2022\par \par \par \par INTERPRETATION:  Clinical History / Reason for exam: Dense breast screening\par \par The patient reports her last clinical breast examination was performed 6 months.\par \par Family history: None\par \par Comparisons: Priors dating back to 2017.\par \par Findings:\par \par Ultrasound:\par \par Bilateral whole breast ultrasound was performed.\par \par Right breast:\par At the 2 to 3:00 position 4 cm from the nipple, there is a stable hypoechoic mass measuring 1.1 x 1.0 x 0.5 cm, benign.\par \par At the 9:00 position 2 cm from the nipple, there is a biopsy benign mass measuring 1.6 x 1.2 x 0.6 cm.\par \par No additional solid or cystic masses. No axillary adenopathy.\par \par Left breast:\par At the 2:00 position 5 cm from the nipple, there is a stable biopsied benign mass measuring 1.2 x 1.1 x 0.5 cm.\par \par No additional solid or cystic masses. No axillary adenopathy.\par \par Impression: No sonographic evidence of malignancy.\par \par Recommendation: Unless otherwise indicated by clinical findings, annual screening mammography recommended.\par \par BI-RADS Category 2: Benign\par \par

## 2022-03-10 NOTE — HISTORY OF PRESENT ILLNESS
[FreeTextEntry1] : Eladio is a 50F who presents as a 6 month follow up.  She was initially seen because of a left breast abscess for which she underwent a US guided drainage procedure without any further recurrence of the abscess. \par \par On her most recent imaging performed on 1/22/18, she was found to have 2 asymmetries in the L breast that effaced with spot compression and likely represented fibroglandular tissue.  She additionally underwent bilateral US which revealed the following: \par \par 1/22/18 -- bilateral breast US \par -R breast: @9:00, 2-3 cm from nipple, 2 x 1.3 x 0.8 cm irregular hypoechoic mass --> rec biopsy \par -R breast: @2-3:00, 4 cm from nipple, 1.1 x 1.1 x 0.4 cm circumscribed hypoechoic mass --> rec 6 month follow R breast US \par -L breast: @9:00, 5 cm from nipple, 1.3 x 1.1 x 0.6 cm irregular hypoechoic mass --> rec biopsy\par \par 1/30/18 -- bilateral US guided biopsies\par -R breast @9:00, 2-3 cm from nipple --> fibroadenoma \par -L breast @9:00, 5 cm from nipple --> fibroadenoma/PASH \par \par INTERVAL HISTORY: \ryland Hendricks returns for a 12 month follow up.  \par \par She denies any breast related complaints at this time.  She denies any breast pain, no nipple discharge and no palpable lesions.  She denies any fevers or chills and has not had any recurrence of her left breast abscess.  Although she does have a residual skin lesion in the area of her prior abscess site. \par \par Since her last visit, she had a b/l screening mammogram and b/l breast US on 3/3/2021, which revealed  stability of three masses: in her right breast  @2-3N4, and in her left breast @2N5, as well as a stable mass in her right breast @9N2, measuring 1.1 x 1 x 0.5 cm deemed BIRADS 2.  \par \par \par INTERVAL HISTORY: \ryland Hendricks returns for a 12 month follow up.  \par \par She denies any breast related complaints at this time.  She denies any breast pain, no nipple discharge and no palpable lesions\par \par Her imaging is as follows:\par 03/04/2022 b/l mammo and US\par -breasts are heterogeneously dense\par -stable asymmetries seen in both breasts\par BI-RADS 2\par \par RIGHT US:\par -@ 2 to 3N 4 stable hypoechoic mass measuring 1.1 x 1.0 x 0.5 cm, benign.\par -@9N 2  biopsy benign mass measuring 1.6 x 1.2 x 0.6 cm.\par \par LEFT US:\par -@2N5 stable biopsied benign mass measuring 1.2 x 1.1 x 0.5 cm.\par BI-RADS2

## 2022-03-10 NOTE — PHYSICAL EXAM
[Normocephalic] : normocephalic [Atraumatic] : atraumatic [EOMI] : extra ocular movement intact [Examined in the supine and seated position] : examined in the supine and seated position [Symmetrical] : symmetrical [No Axillary Lymphadenopathy] : no left axillary lymphadenopathy [No Edema] : no edema [No Rashes] : no rashes [No Ulceration] : no ulceration [de-identified] : no suspicious masses palpated, however she does have nodularities present @9-10N2, @7-8N1 \par  [de-identified] : hypertrophic scar present in lower inner quadrant, no other signs of abscess recurrence, no erythema or induration, fullness palpated in the UOQ. no other discrete masses palpated. nodularity palpated @2N1-2\par

## 2022-06-09 ENCOUNTER — OUTPATIENT (OUTPATIENT)
Dept: OUTPATIENT SERVICES | Facility: HOSPITAL | Age: 52
LOS: 1 days | Discharge: HOME | End: 2022-06-09

## 2022-06-09 ENCOUNTER — APPOINTMENT (OUTPATIENT)
Dept: OBGYN | Facility: CLINIC | Age: 52
End: 2022-06-09
Payer: COMMERCIAL

## 2022-06-09 VITALS
HEIGHT: 60 IN | WEIGHT: 123 LBS | DIASTOLIC BLOOD PRESSURE: 75 MMHG | BODY MASS INDEX: 24.15 KG/M2 | SYSTOLIC BLOOD PRESSURE: 137 MMHG

## 2022-06-09 DIAGNOSIS — Z01.419 ENCOUNTER FOR GYNECOLOGICAL EXAMINATION (GENERAL) (ROUTINE) W/OUT ABNORMAL FINDINGS: ICD-10-CM

## 2022-06-09 DIAGNOSIS — R92.8 OTHER ABNORMAL AND INCONCLUSIVE FINDINGS ON DIAGNOSTIC IMAGING OF BREAST: ICD-10-CM

## 2022-06-09 PROCEDURE — 99396 PREV VISIT EST AGE 40-64: CPT

## 2022-06-09 RX ORDER — NAPROXEN 500 MG/1
500 TABLET ORAL
Qty: 30 | Refills: 0 | Status: DISCONTINUED | COMMUNITY
Start: 2020-12-28 | End: 2022-06-09

## 2022-06-09 NOTE — HISTORY OF PRESENT ILLNESS
[FreeTextEntry1] : 52yo P2 LMP 5/20, here for annual exam. Reports monthly cycles, normal flow, with pre-menstrual cramping for the past year. She follows with breast surgeon for h/o fibroadenomas, last screened 03/2022. \par Otherwise, denies hot flushes, vaginal dryness, dyspareunia, mood changes. Reports mother went through menopause at age 50.\par \par Last mammo/breast US - 03/2022, BIRADS 2\par Last papsmear - 05/2020 NILM, HPV neg\par \par Does not desire STI testing.

## 2022-06-09 NOTE — PLAN
[FreeTextEntry1] : 51y P2 with LMP 5/20, s/p annual exam\par \par Last pap smear 5/2020 NILM, HPV neg\par #hx of breast cysts/fibroadenoma, follows with Dr. Burnette\par 3/2022 Mammogram/US BIRADS 2\par \par RTC 1 year for annual

## 2022-06-22 ENCOUNTER — APPOINTMENT (OUTPATIENT)
Dept: INTERNAL MEDICINE | Facility: CLINIC | Age: 52
End: 2022-06-22

## 2022-07-18 ENCOUNTER — APPOINTMENT (OUTPATIENT)
Dept: OPHTHALMOLOGY | Facility: CLINIC | Age: 52
End: 2022-07-18

## 2022-07-18 ENCOUNTER — OUTPATIENT (OUTPATIENT)
Dept: OUTPATIENT SERVICES | Facility: HOSPITAL | Age: 52
LOS: 1 days | Discharge: HOME | End: 2022-07-18

## 2022-07-18 PROCEDURE — 92014 COMPRE OPH EXAM EST PT 1/>: CPT

## 2022-09-12 ENCOUNTER — APPOINTMENT (OUTPATIENT)
Dept: INTERNAL MEDICINE | Facility: CLINIC | Age: 52
End: 2022-09-12

## 2022-10-17 LAB
ALBUMIN SERPL ELPH-MCNC: 4.7 G/DL
ALP BLD-CCNC: 72 U/L
ALT SERPL-CCNC: 30 U/L
ANION GAP SERPL CALC-SCNC: 12 MMOL/L
AST SERPL-CCNC: 26 U/L
BASOPHILS # BLD AUTO: 0.05 K/UL
BASOPHILS NFR BLD AUTO: 0.8 %
BILIRUB SERPL-MCNC: 0.4 MG/DL
BUN SERPL-MCNC: 13 MG/DL
CALCIUM SERPL-MCNC: 10.1 MG/DL
CHLORIDE SERPL-SCNC: 102 MMOL/L
CHOLEST SERPL-MCNC: 169 MG/DL
CO2 SERPL-SCNC: 24 MMOL/L
CREAT SERPL-MCNC: 0.5 MG/DL
EGFR: 114 ML/MIN/1.73M2
EOSINOPHIL # BLD AUTO: 0.12 K/UL
EOSINOPHIL NFR BLD AUTO: 1.8 %
ESTIMATED AVERAGE GLUCOSE: 120 MG/DL
GLUCOSE SERPL-MCNC: 85 MG/DL
HBA1C MFR BLD HPLC: 5.8 %
HCT VFR BLD CALC: 40.7 %
HDLC SERPL-MCNC: 42 MG/DL
HGB BLD-MCNC: 13.4 G/DL
IMM GRANULOCYTES NFR BLD AUTO: 0.3 %
LDLC SERPL CALC-MCNC: 102 MG/DL
LYMPHOCYTES # BLD AUTO: 2.56 K/UL
LYMPHOCYTES NFR BLD AUTO: 38.4 %
MAN DIFF?: NORMAL
MCHC RBC-ENTMCNC: 28.8 PG
MCHC RBC-ENTMCNC: 32.9 G/DL
MCV RBC AUTO: 87.3 FL
MONOCYTES # BLD AUTO: 0.76 K/UL
MONOCYTES NFR BLD AUTO: 11.4 %
NEUTROPHILS # BLD AUTO: 3.15 K/UL
NEUTROPHILS NFR BLD AUTO: 47.3 %
NONHDLC SERPL-MCNC: 127 MG/DL
PLATELET # BLD AUTO: 346 K/UL
POTASSIUM SERPL-SCNC: 4.4 MMOL/L
PROT SERPL-MCNC: 7.5 G/DL
RBC # BLD: 4.66 M/UL
RBC # FLD: 13.7 %
SODIUM SERPL-SCNC: 138 MMOL/L
TRIGL SERPL-MCNC: 127 MG/DL
TSH SERPL-ACNC: 3.14 UIU/ML
WBC # FLD AUTO: 6.66 K/UL

## 2022-10-18 LAB — 25(OH)D3 SERPL-MCNC: 27 NG/ML

## 2022-10-24 ENCOUNTER — APPOINTMENT (OUTPATIENT)
Dept: INTERNAL MEDICINE | Facility: CLINIC | Age: 52
End: 2022-10-24

## 2022-12-07 ENCOUNTER — APPOINTMENT (OUTPATIENT)
Dept: INTERNAL MEDICINE | Facility: CLINIC | Age: 52
End: 2022-12-07

## 2022-12-07 ENCOUNTER — OUTPATIENT (OUTPATIENT)
Dept: OUTPATIENT SERVICES | Facility: HOSPITAL | Age: 52
LOS: 1 days | Discharge: HOME | End: 2022-12-07

## 2022-12-07 VITALS
DIASTOLIC BLOOD PRESSURE: 82 MMHG | OXYGEN SATURATION: 97 % | HEART RATE: 73 BPM | BODY MASS INDEX: 24.15 KG/M2 | TEMPERATURE: 98.6 F | WEIGHT: 123 LBS | HEIGHT: 60 IN | SYSTOLIC BLOOD PRESSURE: 143 MMHG

## 2022-12-07 VITALS — DIASTOLIC BLOOD PRESSURE: 76 MMHG | SYSTOLIC BLOOD PRESSURE: 121 MMHG

## 2022-12-07 DIAGNOSIS — N92.1 EXCESSIVE AND FREQUENT MENSTRUATION WITH IRREGULAR CYCLE: ICD-10-CM

## 2022-12-07 DIAGNOSIS — Z00.00 ENCOUNTER FOR GENERAL ADULT MEDICAL EXAMINATION WITHOUT ABNORMAL FINDINGS: ICD-10-CM

## 2022-12-07 DIAGNOSIS — R73.03 PREDIABETES: ICD-10-CM

## 2022-12-07 DIAGNOSIS — S16.1XXA STRAIN OF MUSCLE, FASCIA AND TENDON AT NECK LEVEL, INITIAL ENCOUNTER: ICD-10-CM

## 2022-12-07 PROCEDURE — 99214 OFFICE O/P EST MOD 30 MIN: CPT | Mod: GC

## 2022-12-07 NOTE — HISTORY OF PRESENT ILLNESS
[FreeTextEntry1] : 1 year F/u [de-identified] : 50 y/o F w/ PMHx of fibroadenoma and cervical polyp presents to establish care. Previous PCP was Dr. Wang, who no longer is working in the OP setting (last visit 12/2021). C/o atraumatic R neck pain x 1 year, intermittent. Also c/o irregular menstrual bleeding & intermittent L pelvic pain since 7/2022. Denies: hot flashes, extremity numbness/weakness

## 2022-12-07 NOTE — ASSESSMENT
[FreeTextEntry1] : 52 y/o F w/ PMHx of fibroadenoma and cervical polyp is here to establish care.\par \par #Irregular menstrual bleeding\par -Since 7/2022, irregular bleeding\par -Denies hot flashes\par -?menopause\par -LH, FSH, Progesterone, Prolactin & Estradiol ordered\par - Follow-up OBGYN\par \par #PreDM\par - HbA1c 5.8% (10/2022)\par - Counseled on lifestyle modification (diet + exercise)\par \par #Chronic Neck strain \par -C/o intermittent R sided neck pain x 1 year\par -Likely 2/2 sleeping position\par -PT referral provided\par -Advised to use OTC Tylenol prn\par \par #Vit D Insufficiency\par -C/w OTC vit D supplements\par \par #Hypercalcemia, resolved\par \par #Plantar Fibroma, resolved \par -Seen by podiatry \par \par #HCM\par -COVID-19 vaccination x 2; no booster\par -Influenza vaccination received in 9/2022\par -Colonoscopy: never had a colonoscopy, agreeable to colonoscopy, initiated direct colonoscopy \par -Mammogram (3/2022): BI-RADS Cat 2, repeat in 1 year\par -Pap smear (5/2020): WNL, HPV -ve; repeat in 5 years; follows w/ GYN\par \par RTC in 6 months\par

## 2022-12-07 NOTE — HISTORY OF PRESENT ILLNESS
[FreeTextEntry1] : 1 year F/u [de-identified] : 50 y/o F w/ PMHx of fibroadenoma and cervical polyp presents to establish care. Previous PCP was Dr. Wang, who no longer is working in the OP setting (last visit 12/2021). C/o atraumatic R neck pain x 1 year, intermittent. Also c/o irregular menstrual bleeding & intermittent L pelvic pain since 7/2022. Denies: hot flashes, extremity numbness/weakness

## 2022-12-07 NOTE — ASSESSMENT
[FreeTextEntry1] : 50 y/o F w/ PMHx of fibroadenoma and cervical polyp is here to establish care.\par \par #Irregular menstrual bleeding\par -Since 7/2022, irregular bleeding\par -Denies hot flashes\par -?menopause\par -LH, FSH, Progesterone, Prolactin & Estradiol ordered\par - Follow-up OBGYN\par \par #PreDM\par - HbA1c 5.8% (10/2022)\par - Counseled on lifestyle modification (diet + exercise)\par \par #Chronic Neck strain \par -C/o intermittent R sided neck pain x 1 year\par -Likely 2/2 sleeping position\par -PT referral provided\par -Advised to use OTC Tylenol prn\par \par #Vit D Insufficiency\par -C/w OTC vit D supplements\par \par #Hypercalcemia, resolved\par \par #Plantar Fibroma, resolved \par -Seen by podiatry \par \par #HCM\par -COVID-19 vaccination x 2; no booster\par -Influenza vaccination received in 9/2022\par -Colonoscopy: never had a colonoscopy, agreeable to colonoscopy, initiated direct colonoscopy \par -Mammogram (3/2022): BI-RADS Cat 2, repeat in 1 year\par -Pap smear (5/2020): WNL, HPV -ve; repeat in 5 years; follows w/ GYN\par \par RTC in 6 months\par

## 2022-12-07 NOTE — PHYSICAL EXAM
[No Acute Distress] : no acute distress [Well-Appearing] : well-appearing [PERRL] : pupils equal round and reactive to light [Normal Oropharynx] : the oropharynx was normal [No Respiratory Distress] : no respiratory distress  [No Accessory Muscle Use] : no accessory muscle use [Clear to Auscultation] : lungs were clear to auscultation bilaterally [Normal Rate] : normal rate  [Regular Rhythm] : with a regular rhythm [No Edema] : there was no peripheral edema

## 2022-12-12 LAB
ESTRADIOL SERPL-MCNC: 27 PG/ML
FSH SERPL-MCNC: 12.4 IU/L
LH SERPL-ACNC: 8.3 IU/L
PROGEST SERPL-MCNC: 0.2 NG/ML
PROLACTIN SERPL-MCNC: 14.1 NG/ML

## 2022-12-15 ENCOUNTER — NON-APPOINTMENT (OUTPATIENT)
Age: 52
End: 2022-12-15

## 2023-02-01 ENCOUNTER — OUTPATIENT (OUTPATIENT)
Dept: OUTPATIENT SERVICES | Facility: HOSPITAL | Age: 53
LOS: 1 days | End: 2023-02-01

## 2023-02-06 ENCOUNTER — OUTPATIENT (OUTPATIENT)
Dept: INPATIENT UNIT | Facility: HOSPITAL | Age: 53
LOS: 1 days | Discharge: HOME | End: 2023-02-06
Payer: COMMERCIAL

## 2023-02-06 ENCOUNTER — TRANSCRIPTION ENCOUNTER (OUTPATIENT)
Age: 53
End: 2023-02-06

## 2023-02-06 VITALS
SYSTOLIC BLOOD PRESSURE: 125 MMHG | HEART RATE: 62 BPM | RESPIRATION RATE: 17 BRPM | DIASTOLIC BLOOD PRESSURE: 68 MMHG | OXYGEN SATURATION: 100 %

## 2023-02-06 VITALS
TEMPERATURE: 98 F | WEIGHT: 121.03 LBS | DIASTOLIC BLOOD PRESSURE: 63 MMHG | SYSTOLIC BLOOD PRESSURE: 115 MMHG | RESPIRATION RATE: 18 BRPM | HEART RATE: 71 BPM | HEIGHT: 61 IN

## 2023-02-06 DIAGNOSIS — Z12.11 ENCOUNTER FOR SCREENING FOR MALIGNANT NEOPLASM OF COLON: ICD-10-CM

## 2023-02-06 PROCEDURE — 81025 URINE PREGNANCY TEST: CPT

## 2023-02-06 PROCEDURE — G0121 COLON CA SCRN NOT HI RSK IND: CPT

## 2023-02-06 RX ORDER — SODIUM CHLORIDE 9 MG/ML
1000 INJECTION, SOLUTION INTRAVENOUS
Refills: 0 | Status: DISCONTINUED | OUTPATIENT
Start: 2023-02-06 | End: 2023-02-09

## 2023-02-06 NOTE — PRE-ANESTHESIA EVALUATION ADULT - NSANTHOSAYNRD_GEN_A_CORE
No. ANTONELLA screening performed.  STOP BANG Legend: 0-2 = LOW Risk; 3-4 = INTERMEDIATE Risk; 5-8 = HIGH Risk

## 2023-02-06 NOTE — ASU DISCHARGE PLAN (ADULT/PEDIATRIC) - NS MD DC FALL RISK RISK
For information on Fall & Injury Prevention, visit: [email or web address] OR  [email or web address] OR  https://www.cdc.gov/steadi/patient.html

## 2023-02-06 NOTE — ASU DISCHARGE PLAN (ADULT/PEDIATRIC) - ASU DC SPECIAL INSTRUCTIONSFT
Follow up in [facility] clinic next available   [facility] clinic is at [address] [place] [address] . [place] , [place] [address] . Phone number  [phone]

## 2023-02-08 ENCOUNTER — OUTPATIENT (OUTPATIENT)
Dept: OUTPATIENT SERVICES | Facility: HOSPITAL | Age: 53
LOS: 1 days | End: 2023-02-08
Payer: COMMERCIAL

## 2023-02-08 DIAGNOSIS — Z00.8 ENCOUNTER FOR OTHER GENERAL EXAMINATION: ICD-10-CM

## 2023-02-08 PROCEDURE — 97140 MANUAL THERAPY 1/> REGIONS: CPT | Mod: GO

## 2023-02-08 PROCEDURE — 97110 THERAPEUTIC EXERCISES: CPT | Mod: GO

## 2023-02-09 DIAGNOSIS — Z00.8 ENCOUNTER FOR OTHER GENERAL EXAMINATION: ICD-10-CM

## 2023-02-12 DIAGNOSIS — M54.2 CERVICALGIA: ICD-10-CM

## 2023-02-13 ENCOUNTER — OUTPATIENT (OUTPATIENT)
Dept: OUTPATIENT SERVICES | Facility: HOSPITAL | Age: 53
LOS: 1 days | End: 2023-02-13

## 2023-02-13 DIAGNOSIS — M54.2 CERVICALGIA: ICD-10-CM

## 2023-02-13 DIAGNOSIS — Z12.11 ENCOUNTER FOR SCREENING FOR MALIGNANT NEOPLASM OF COLON: ICD-10-CM

## 2023-02-13 DIAGNOSIS — K64.8 OTHER HEMORRHOIDS: ICD-10-CM

## 2023-02-27 ENCOUNTER — OUTPATIENT (OUTPATIENT)
Dept: OUTPATIENT SERVICES | Facility: HOSPITAL | Age: 53
LOS: 1 days | End: 2023-02-27

## 2023-02-27 DIAGNOSIS — M54.2 CERVICALGIA: ICD-10-CM

## 2023-03-01 ENCOUNTER — OUTPATIENT (OUTPATIENT)
Dept: OUTPATIENT SERVICES | Facility: HOSPITAL | Age: 53
LOS: 1 days | End: 2023-03-01
Payer: COMMERCIAL

## 2023-03-01 DIAGNOSIS — M54.2 CERVICALGIA: ICD-10-CM

## 2023-03-01 PROCEDURE — 97110 THERAPEUTIC EXERCISES: CPT | Mod: GO

## 2023-03-01 PROCEDURE — 97140 MANUAL THERAPY 1/> REGIONS: CPT | Mod: GO

## 2023-03-06 ENCOUNTER — OUTPATIENT (OUTPATIENT)
Dept: OUTPATIENT SERVICES | Facility: HOSPITAL | Age: 53
LOS: 1 days | End: 2023-03-06

## 2023-03-06 DIAGNOSIS — M54.2 CERVICALGIA: ICD-10-CM

## 2023-03-08 ENCOUNTER — OUTPATIENT (OUTPATIENT)
Dept: OUTPATIENT SERVICES | Facility: HOSPITAL | Age: 53
LOS: 1 days | End: 2023-03-08

## 2023-03-08 DIAGNOSIS — M54.2 CERVICALGIA: ICD-10-CM

## 2023-03-09 ENCOUNTER — RESULT REVIEW (OUTPATIENT)
Age: 53
End: 2023-03-09

## 2023-03-09 ENCOUNTER — OUTPATIENT (OUTPATIENT)
Dept: OUTPATIENT SERVICES | Facility: HOSPITAL | Age: 53
LOS: 1 days | End: 2023-03-09
Payer: COMMERCIAL

## 2023-03-09 DIAGNOSIS — Z00.8 ENCOUNTER FOR OTHER GENERAL EXAMINATION: ICD-10-CM

## 2023-03-09 DIAGNOSIS — Z12.31 ENCOUNTER FOR SCREENING MAMMOGRAM FOR MALIGNANT NEOPLASM OF BREAST: ICD-10-CM

## 2023-03-09 PROCEDURE — 77067 SCR MAMMO BI INCL CAD: CPT | Mod: 26

## 2023-03-09 PROCEDURE — 76641 ULTRASOUND BREAST COMPLETE: CPT | Mod: 50

## 2023-03-09 PROCEDURE — 77067 SCR MAMMO BI INCL CAD: CPT

## 2023-03-09 PROCEDURE — 77063 BREAST TOMOSYNTHESIS BI: CPT

## 2023-03-09 PROCEDURE — 77063 BREAST TOMOSYNTHESIS BI: CPT | Mod: 26

## 2023-03-09 PROCEDURE — 76641 ULTRASOUND BREAST COMPLETE: CPT | Mod: 26,50

## 2023-03-10 DIAGNOSIS — Z12.31 ENCOUNTER FOR SCREENING MAMMOGRAM FOR MALIGNANT NEOPLASM OF BREAST: ICD-10-CM

## 2023-03-13 ENCOUNTER — OUTPATIENT (OUTPATIENT)
Dept: OUTPATIENT SERVICES | Facility: HOSPITAL | Age: 53
LOS: 1 days | End: 2023-03-13

## 2023-03-13 DIAGNOSIS — M54.2 CERVICALGIA: ICD-10-CM

## 2023-03-15 ENCOUNTER — OUTPATIENT (OUTPATIENT)
Dept: OUTPATIENT SERVICES | Facility: HOSPITAL | Age: 53
LOS: 1 days | End: 2023-03-15

## 2023-03-15 DIAGNOSIS — M54.2 CERVICALGIA: ICD-10-CM

## 2023-03-29 ENCOUNTER — OUTPATIENT (OUTPATIENT)
Dept: OUTPATIENT SERVICES | Facility: HOSPITAL | Age: 53
LOS: 1 days | End: 2023-03-29

## 2023-03-29 DIAGNOSIS — M54.2 CERVICALGIA: ICD-10-CM

## 2023-04-03 ENCOUNTER — OUTPATIENT (OUTPATIENT)
Dept: OUTPATIENT SERVICES | Facility: HOSPITAL | Age: 53
LOS: 1 days | End: 2023-04-03
Payer: COMMERCIAL

## 2023-04-03 DIAGNOSIS — M54.2 CERVICALGIA: ICD-10-CM

## 2023-04-03 PROCEDURE — 97140 MANUAL THERAPY 1/> REGIONS: CPT | Mod: GO

## 2023-04-03 PROCEDURE — 97110 THERAPEUTIC EXERCISES: CPT | Mod: GO

## 2023-06-05 ENCOUNTER — NON-APPOINTMENT (OUTPATIENT)
Age: 53
End: 2023-06-05

## 2023-06-05 ENCOUNTER — APPOINTMENT (OUTPATIENT)
Dept: INTERNAL MEDICINE | Facility: CLINIC | Age: 53
End: 2023-06-05
Payer: COMMERCIAL

## 2023-06-05 ENCOUNTER — APPOINTMENT (OUTPATIENT)
Dept: INTERNAL MEDICINE | Facility: CLINIC | Age: 53
End: 2023-06-05

## 2023-06-05 ENCOUNTER — OUTPATIENT (OUTPATIENT)
Dept: OUTPATIENT SERVICES | Facility: HOSPITAL | Age: 53
LOS: 1 days | End: 2023-06-05
Payer: COMMERCIAL

## 2023-06-05 VITALS
BODY MASS INDEX: 24.35 KG/M2 | HEIGHT: 60 IN | SYSTOLIC BLOOD PRESSURE: 135 MMHG | DIASTOLIC BLOOD PRESSURE: 69 MMHG | HEART RATE: 83 BPM | TEMPERATURE: 97.5 F | WEIGHT: 124 LBS | OXYGEN SATURATION: 98 %

## 2023-06-05 DIAGNOSIS — R73.03 PREDIABETES.: ICD-10-CM

## 2023-06-05 DIAGNOSIS — R74.8 ABNORMAL LEVELS OF OTHER SERUM ENZYMES: ICD-10-CM

## 2023-06-05 DIAGNOSIS — M54.9 DORSALGIA, UNSPECIFIED: ICD-10-CM

## 2023-06-05 DIAGNOSIS — D21.9 BENIGN NEOPLASM OF CONNECTIVE AND OTHER SOFT TISSUE, UNSPECIFIED: ICD-10-CM

## 2023-06-05 DIAGNOSIS — Z00.00 ENCOUNTER FOR GENERAL ADULT MEDICAL EXAMINATION WITHOUT ABNORMAL FINDINGS: ICD-10-CM

## 2023-06-05 PROCEDURE — 99213 OFFICE O/P EST LOW 20 MIN: CPT

## 2023-06-05 PROCEDURE — 99213 OFFICE O/P EST LOW 20 MIN: CPT | Mod: GC

## 2023-06-05 RX ORDER — POLYETHYLENE GLYCOL 3350 AND ELECTROLYTES WITH LEMON FLAVOR 236; 22.74; 6.74; 5.86; 2.97 G/4L; G/4L; G/4L; G/4L; G/4L
236 POWDER, FOR SOLUTION ORAL
Qty: 1 | Refills: 0 | Status: DISCONTINUED | COMMUNITY
Start: 2022-12-07 | End: 2023-06-05

## 2023-06-05 RX ORDER — POLYETHYLENE GLYCOL 3350 17 G/17G
17 POWDER, FOR SOLUTION ORAL
Qty: 14 | Refills: 0 | Status: DISCONTINUED | COMMUNITY
Start: 2021-06-23 | End: 2023-06-05

## 2023-06-05 RX ORDER — CYCLOBENZAPRINE HYDROCHLORIDE 5 MG/1
5 TABLET, FILM COATED ORAL
Qty: 30 | Refills: 0 | Status: DISCONTINUED | COMMUNITY
Start: 2022-12-07 | End: 2023-06-05

## 2023-06-05 RX ORDER — MULTIVIT-MIN/FOLIC/VIT K/LYCOP 400-300MCG
50 MCG TABLET ORAL
Qty: 90 | Refills: 3 | Status: DISCONTINUED | COMMUNITY
Start: 2019-03-09 | End: 2023-06-05

## 2023-06-05 RX ORDER — BISACODYL 10 MG/1
10 SUPPOSITORY RECTAL AT BEDTIME
Qty: 2 | Refills: 0 | Status: DISCONTINUED | COMMUNITY
Start: 2022-12-07 | End: 2023-06-05

## 2023-06-05 RX ORDER — CICLOPIROX 80 MG/ML
8 SOLUTION TOPICAL
Qty: 1 | Refills: 6 | Status: DISCONTINUED | COMMUNITY
Start: 2020-12-21 | End: 2023-06-05

## 2023-06-05 NOTE — ASSESSMENT
[FreeTextEntry1] : 50 y/o F w/ PMHx of fibroadenoma and cervical polyp is here to establish care.\par \par #Irregular menstrual bleeding\par -Since 7/2022, irregular bleeding\par -Denies hot flashes\par -LH, FSH, Progesterone, Prolactin & Estradiol normal\par - Follow-up OBGYN\par \par #PreDM\par - HbA1c 5.8% (10/2022)\par - Counseled on lifestyle modification (diet + exercise)\par \par #Chronic Neck strain \par - resolved after physical therapy\par \par \par #Hypercalcemia, resolved\par \par #Plantar Fibroma, resolved \par -Seen by podiatry \par \par #HCM\par -COVID-19 vaccination x 2; no booster\par -Influenza vaccination received in 9/2022\par -Colonoscopy done in February will need to repeat due to poor preparation\par -Mammogram (3/2023): BI-RADS Cat 2, repeat in 1 year\par -Pap smear (5/2020): WNL, HPV -ve; repeat in 5 years; follows w/ GYN will see them in 6/20/2023\par RTC in 6 months with labs\par

## 2023-06-05 NOTE — HISTORY OF PRESENT ILLNESS
[FreeTextEntry1] : 1 year F/u [de-identified] : 53 y/o F w/ PMHx of fibroadenoma and cervical polyp presents for follow up. \par Endorses irregular menses, no hot flashes, mood swings or heavy bleeding\par Will need to repeat colonoscopy due to inappropriate preparation\par No other complaints\par

## 2023-06-05 NOTE — HISTORY OF PRESENT ILLNESS
[FreeTextEntry1] : 1 year F/u [de-identified] : 51 y/o F w/ PMHx of fibroadenoma and cervical polyp presents for follow up. \par Endorses irregular menses, no hot flashes, mood swings or heavy bleeding\par Will need to repeat colonoscopy due to inappropriate preparation\par No other complaints\par

## 2023-06-07 DIAGNOSIS — Z00.00 ENCOUNTER FOR GENERAL ADULT MEDICAL EXAMINATION WITHOUT ABNORMAL FINDINGS: ICD-10-CM

## 2023-06-07 DIAGNOSIS — M54.9 DORSALGIA, UNSPECIFIED: ICD-10-CM

## 2023-06-07 DIAGNOSIS — E55.9 VITAMIN D DEFICIENCY, UNSPECIFIED: ICD-10-CM

## 2023-06-07 DIAGNOSIS — D21.9 BENIGN NEOPLASM OF CONNECTIVE AND OTHER SOFT TISSUE, UNSPECIFIED: ICD-10-CM

## 2023-06-07 DIAGNOSIS — R74.8 ABNORMAL LEVELS OF OTHER SERUM ENZYMES: ICD-10-CM

## 2023-06-07 DIAGNOSIS — R73.03 PREDIABETES: ICD-10-CM

## 2023-06-16 ENCOUNTER — APPOINTMENT (OUTPATIENT)
Dept: PODIATRY | Facility: CLINIC | Age: 53
End: 2023-06-16
Payer: COMMERCIAL

## 2023-06-16 ENCOUNTER — OUTPATIENT (OUTPATIENT)
Dept: OUTPATIENT SERVICES | Facility: HOSPITAL | Age: 53
LOS: 1 days | End: 2023-06-16
Payer: COMMERCIAL

## 2023-06-16 DIAGNOSIS — M72.2 PLANTAR FASCIAL FIBROMATOSIS: ICD-10-CM

## 2023-06-16 DIAGNOSIS — Z00.00 ENCOUNTER FOR GENERAL ADULT MEDICAL EXAMINATION WITHOUT ABNORMAL FINDINGS: ICD-10-CM

## 2023-06-16 PROCEDURE — 20551 NJX 1 TENDON ORIGIN/INSJ: CPT

## 2023-06-19 PROBLEM — M72.2 FIBROMATOSIS, PLANTAR: Status: ACTIVE | Noted: 2021-07-09

## 2023-06-19 NOTE — PHYSICAL EXAM
[2+] : left foot dorsalis pedis 2+ [Normal Foot/Ankle] : Both lower extremities were exposed and visualized. Standing exam demonstrates normal foot posture and alignment. Hindfoot exam shows no hindfoot valgus or varus [Skin Color & Pigmentation] : normal skin color and pigmentation [Skin Turgor] : normal skin turgor [] : no rash [de-identified] : nodule located on plantar right arch.

## 2023-06-19 NOTE — HISTORY OF PRESENT ILLNESS
[FreeTextEntry1] : 50 y.o female presents with pain on her plantar right foot. She states she feels like she is walking on a pebble and says there is a ball in the are. She only has pain when she is walking in sandals. She had this problem for 2 months denying any injury.  Denies N, V, F, SOB, C, CP.

## 2023-06-19 NOTE — ASSESSMENT
[FreeTextEntry1] : 52 F presents with plantar fibroma on right. \par Discussed Radiologist findings with Patient in Detail \par  steroidal 3ml injection 1.5cc kenalog1.5cc lido into right foot arch. \par \par RTC as neede.d \par

## 2023-06-20 ENCOUNTER — APPOINTMENT (OUTPATIENT)
Dept: OBGYN | Facility: CLINIC | Age: 53
End: 2023-06-20

## 2023-06-21 DIAGNOSIS — M72.2 PLANTAR FASCIAL FIBROMATOSIS: ICD-10-CM

## 2023-06-21 DIAGNOSIS — M79.671 PAIN IN RIGHT FOOT: ICD-10-CM

## 2023-08-01 ENCOUNTER — APPOINTMENT (OUTPATIENT)
Dept: OBGYN | Facility: CLINIC | Age: 53
End: 2023-08-01
Payer: COMMERCIAL

## 2023-08-01 ENCOUNTER — OUTPATIENT (OUTPATIENT)
Dept: OUTPATIENT SERVICES | Facility: HOSPITAL | Age: 53
LOS: 1 days | End: 2023-08-01
Payer: COMMERCIAL

## 2023-08-01 VITALS
DIASTOLIC BLOOD PRESSURE: 62 MMHG | SYSTOLIC BLOOD PRESSURE: 128 MMHG | BODY MASS INDEX: 23.95 KG/M2 | HEIGHT: 60 IN | WEIGHT: 122 LBS

## 2023-08-01 DIAGNOSIS — Z01.419 ENCOUNTER FOR GYNECOLOGICAL EXAMINATION (GENERAL) (ROUTINE) WITHOUT ABNORMAL FINDINGS: ICD-10-CM

## 2023-08-01 PROCEDURE — 99396 PREV VISIT EST AGE 40-64: CPT

## 2023-08-01 PROCEDURE — 88142 CYTOPATH C/V THIN LAYER: CPT

## 2023-08-01 NOTE — END OF VISIT
[] : Resident [FreeTextEntry3] : Uncomplicated annual exam. Follow up Pap/HPV. Mammogram up to date. RTC 1 year or PRN

## 2023-08-01 NOTE — HISTORY OF PRESENT ILLNESS
[FreeTextEntry1] : 52yo , LMP 23, perimenopausal woman here for her annual appointment and to establish care. Patient reports that her periods have become more irregular and will be early or late, but she has not yet missed a period. She denies intermenstural spotting. She is sexually active and mongamous with her .  Last mammo: 2023, BIRADS 2 Last colonoscopy: 2023, inadequate but no gross abnormalities noted

## 2023-08-01 NOTE — DISCUSSION/SUMMARY
[FreeTextEntry1] : A/P: 52yo P2, perimenopausal, here for her annual exam - PAP smear done in office today - declined STI testing today  RTC in 1 year or PRN

## 2023-08-02 ENCOUNTER — OUTPATIENT (OUTPATIENT)
Dept: OUTPATIENT SERVICES | Facility: HOSPITAL | Age: 53
LOS: 1 days | End: 2023-08-02
Payer: COMMERCIAL

## 2023-08-02 DIAGNOSIS — Z01.419 ENCOUNTER FOR GYNECOLOGICAL EXAMINATION (GENERAL) (ROUTINE) WITHOUT ABNORMAL FINDINGS: ICD-10-CM

## 2023-08-02 PROCEDURE — 87624 HPV HI-RISK TYP POOLED RSLT: CPT

## 2023-08-08 DIAGNOSIS — Z00.00 ENCOUNTER FOR GENERAL ADULT MEDICAL EXAMINATION WITHOUT ABNORMAL FINDINGS: ICD-10-CM

## 2023-08-08 LAB
CYTOLOGY CVX/VAG DOC THIN PREP: NORMAL
HPV HIGH+LOW RISK DNA PNL CVX: NOT DETECTED

## 2023-08-09 DIAGNOSIS — Z00.00 ENCOUNTER FOR GENERAL ADULT MEDICAL EXAMINATION WITHOUT ABNORMAL FINDINGS: ICD-10-CM

## 2023-10-06 ENCOUNTER — APPOINTMENT (OUTPATIENT)
Dept: GASTROENTEROLOGY | Facility: CLINIC | Age: 53
End: 2023-10-06
Payer: COMMERCIAL

## 2023-10-06 ENCOUNTER — OUTPATIENT (OUTPATIENT)
Dept: OUTPATIENT SERVICES | Facility: HOSPITAL | Age: 53
LOS: 1 days | End: 2023-10-06
Payer: COMMERCIAL

## 2023-10-06 VITALS — BODY MASS INDEX: 23.22 KG/M2 | OXYGEN SATURATION: 98 % | WEIGHT: 123 LBS | HEART RATE: 72 BPM | HEIGHT: 61 IN

## 2023-10-06 DIAGNOSIS — Z00.00 ENCOUNTER FOR GENERAL ADULT MEDICAL EXAMINATION WITHOUT ABNORMAL FINDINGS: ICD-10-CM

## 2023-10-06 DIAGNOSIS — Z12.11 ENCOUNTER FOR SCREENING FOR MALIGNANT NEOPLASM OF COLON: ICD-10-CM

## 2023-10-06 DIAGNOSIS — Z12.12 ENCOUNTER FOR SCREENING FOR MALIGNANT NEOPLASM OF COLON: ICD-10-CM

## 2023-10-06 PROCEDURE — 99203 OFFICE O/P NEW LOW 30 MIN: CPT

## 2023-10-06 PROCEDURE — 99203 OFFICE O/P NEW LOW 30 MIN: CPT | Mod: GC

## 2023-10-06 RX ORDER — POLYETHYLENE GLYCOL 3350 AND ELECTROLYTES WITH LEMON FLAVOR 236; 22.74; 6.74; 5.86; 2.97 G/4L; G/4L; G/4L; G/4L; G/4L
236 POWDER, FOR SOLUTION ORAL
Qty: 1 | Refills: 0 | Status: ACTIVE | COMMUNITY
Start: 2023-10-06 | End: 1900-01-01

## 2023-10-11 DIAGNOSIS — Z12.11 ENCOUNTER FOR SCREENING FOR MALIGNANT NEOPLASM OF COLON: ICD-10-CM

## 2023-11-08 NOTE — ASU PREOP CHECKLIST - NSWEIGHTCALCTOOLDRUG_GEN_A_CORE
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

## 2023-12-01 ENCOUNTER — OUTPATIENT (OUTPATIENT)
Dept: OUTPATIENT SERVICES | Facility: HOSPITAL | Age: 53
LOS: 1 days | End: 2023-12-01
Payer: COMMERCIAL

## 2023-12-01 LAB — 25(OH)D3 SERPL-MCNC: 25 NG/ML

## 2023-12-01 PROCEDURE — 80061 LIPID PANEL: CPT

## 2023-12-01 PROCEDURE — 84443 ASSAY THYROID STIM HORMONE: CPT

## 2023-12-01 PROCEDURE — 83036 HEMOGLOBIN GLYCOSYLATED A1C: CPT

## 2023-12-01 PROCEDURE — 80053 COMPREHEN METABOLIC PANEL: CPT

## 2023-12-01 PROCEDURE — 85027 COMPLETE CBC AUTOMATED: CPT

## 2023-12-01 PROCEDURE — 82306 VITAMIN D 25 HYDROXY: CPT

## 2023-12-04 LAB
ALBUMIN SERPL ELPH-MCNC: 4.4 G/DL
ALP BLD-CCNC: 72 U/L
ALT SERPL-CCNC: 34 U/L
ANION GAP SERPL CALC-SCNC: 11 MMOL/L
AST SERPL-CCNC: 29 U/L
BILIRUB SERPL-MCNC: 0.2 MG/DL
BUN SERPL-MCNC: 10 MG/DL
CALCIUM SERPL-MCNC: 10.4 MG/DL
CHLORIDE SERPL-SCNC: 103 MMOL/L
CHOLEST SERPL-MCNC: 167 MG/DL
CO2 SERPL-SCNC: 23 MMOL/L
CREAT SERPL-MCNC: 0.5 MG/DL
EGFR: 113 ML/MIN/1.73M2
ESTIMATED AVERAGE GLUCOSE: 123 MG/DL
GLUCOSE SERPL-MCNC: 97 MG/DL
HBA1C MFR BLD HPLC: 5.9 %
HDLC SERPL-MCNC: 45 MG/DL
LDLC SERPL CALC-MCNC: 101 MG/DL
NONHDLC SERPL-MCNC: 122 MG/DL
POTASSIUM SERPL-SCNC: 3.9 MMOL/L
PROT SERPL-MCNC: 7.3 G/DL
SODIUM SERPL-SCNC: 137 MMOL/L
TRIGL SERPL-MCNC: 103 MG/DL
TSH SERPL-ACNC: 2.68 UIU/ML

## 2023-12-07 ENCOUNTER — OUTPATIENT (OUTPATIENT)
Dept: OUTPATIENT SERVICES | Facility: HOSPITAL | Age: 53
LOS: 1 days | End: 2023-12-07
Payer: COMMERCIAL

## 2023-12-07 ENCOUNTER — APPOINTMENT (OUTPATIENT)
Dept: OPHTHALMOLOGY | Facility: CLINIC | Age: 53
End: 2023-12-07
Payer: COMMERCIAL

## 2023-12-07 DIAGNOSIS — H53.8 OTHER VISUAL DISTURBANCES: ICD-10-CM

## 2023-12-07 PROCEDURE — 92014 COMPRE OPH EXAM EST PT 1/>: CPT

## 2023-12-11 ENCOUNTER — APPOINTMENT (OUTPATIENT)
Dept: INTERNAL MEDICINE | Facility: CLINIC | Age: 53
End: 2023-12-11
Payer: COMMERCIAL

## 2023-12-11 ENCOUNTER — OUTPATIENT (OUTPATIENT)
Dept: OUTPATIENT SERVICES | Facility: HOSPITAL | Age: 53
LOS: 1 days | End: 2023-12-11
Payer: COMMERCIAL

## 2023-12-11 VITALS
TEMPERATURE: 98.4 F | BODY MASS INDEX: 23.03 KG/M2 | SYSTOLIC BLOOD PRESSURE: 133 MMHG | HEART RATE: 91 BPM | HEIGHT: 61 IN | DIASTOLIC BLOOD PRESSURE: 84 MMHG | WEIGHT: 122 LBS | OXYGEN SATURATION: 98 %

## 2023-12-11 DIAGNOSIS — Z00.00 ENCOUNTER FOR GENERAL ADULT MEDICAL EXAMINATION WITHOUT ABNORMAL FINDINGS: ICD-10-CM

## 2023-12-11 DIAGNOSIS — E55.9 VITAMIN D DEFICIENCY, UNSPECIFIED: ICD-10-CM

## 2023-12-11 DIAGNOSIS — Z00.00 ENCOUNTER FOR GENERAL ADULT MEDICAL EXAMINATION W/OUT ABNORMAL FINDINGS: ICD-10-CM

## 2023-12-11 PROCEDURE — 99214 OFFICE O/P EST MOD 30 MIN: CPT

## 2023-12-11 RX ORDER — ERGOCALCIFEROL 1.25 MG/1
1.25 MG CAPSULE, LIQUID FILLED ORAL
Qty: 8 | Refills: 0 | Status: ACTIVE | COMMUNITY
Start: 2023-12-11 | End: 1900-01-01

## 2023-12-12 DIAGNOSIS — Z00.00 ENCOUNTER FOR GENERAL ADULT MEDICAL EXAMINATION WITHOUT ABNORMAL FINDINGS: ICD-10-CM

## 2023-12-12 DIAGNOSIS — E55.9 VITAMIN D DEFICIENCY, UNSPECIFIED: ICD-10-CM

## 2023-12-19 DIAGNOSIS — H26.9 UNSPECIFIED CATARACT: ICD-10-CM

## 2023-12-19 DIAGNOSIS — H02.88A MEIBOMIAN GLAND DYSFUNCTION RIGHT EYE, UPPER AND LOWER EYELIDS: ICD-10-CM

## 2023-12-19 DIAGNOSIS — H16.229 KERATOCONJUNCTIVITIS SICCA, NOT SPECIFIED AS SJOGREN'S, UNSPECIFIED EYE: ICD-10-CM

## 2023-12-19 DIAGNOSIS — D31.41 BENIGN NEOPLASM OF RIGHT CILIARY BODY: ICD-10-CM

## 2024-05-02 ENCOUNTER — TRANSCRIPTION ENCOUNTER (OUTPATIENT)
Age: 54
End: 2024-05-02

## 2024-05-02 ENCOUNTER — OUTPATIENT (OUTPATIENT)
Dept: OUTPATIENT SERVICES | Facility: HOSPITAL | Age: 54
LOS: 1 days | Discharge: HOME | End: 2024-05-02
Payer: COMMERCIAL

## 2024-05-02 ENCOUNTER — RESULT REVIEW (OUTPATIENT)
Age: 54
End: 2024-05-02

## 2024-05-02 VITALS
HEART RATE: 53 BPM | DIASTOLIC BLOOD PRESSURE: 71 MMHG | OXYGEN SATURATION: 100 % | RESPIRATION RATE: 19 BRPM | SYSTOLIC BLOOD PRESSURE: 136 MMHG

## 2024-05-02 VITALS
HEIGHT: 62 IN | WEIGHT: 121.03 LBS | TEMPERATURE: 97 F | DIASTOLIC BLOOD PRESSURE: 67 MMHG | RESPIRATION RATE: 18 BRPM | SYSTOLIC BLOOD PRESSURE: 126 MMHG | HEART RATE: 60 BPM

## 2024-05-02 DIAGNOSIS — Z12.11 ENCOUNTER FOR SCREENING FOR MALIGNANT NEOPLASM OF COLON: ICD-10-CM

## 2024-05-02 PROCEDURE — 88305 TISSUE EXAM BY PATHOLOGIST: CPT | Mod: 26

## 2024-05-02 PROCEDURE — 45380 COLONOSCOPY AND BIOPSY: CPT

## 2024-05-02 PROCEDURE — 88305 TISSUE EXAM BY PATHOLOGIST: CPT

## 2024-05-02 PROCEDURE — 81025 URINE PREGNANCY TEST: CPT

## 2024-05-02 NOTE — ASU DISCHARGE PLAN (ADULT/PEDIATRIC) - NS MD DC FALL RISK RISK
For information on Fall & Injury Prevention, visit: [email or web address] OR  [email or web address] OR  https://www.cdc.gov/steadi/patient.html

## 2024-05-02 NOTE — ASU PREOP CHECKLIST - NS PREOP CHK CHLOROHEX WASH
· Pt with recent hx of stroke a few months ago followed by [facility] thought to be traumatic in setting of falls at home   · Anticoagulation discontinued, comfort measures only N/A

## 2024-05-02 NOTE — ASU DISCHARGE PLAN (ADULT/PEDIATRIC) - ASU DC SPECIAL INSTRUCTIONSFT
- Follow up with our [facility] located at [address] [place] [address]. Phone Number: [phone]

## 2024-05-02 NOTE — ASU PREOP CHECKLIST - PATIENT PROBLEMS/NEEDS
Patient expressed no known problems or needs Opioid Pregnancy And Lactation Text: These medications can lead to premature delivery and should be avoided during pregnancy. These medications are also present in breast milk in small amounts.

## 2024-05-07 LAB — SURGICAL PATHOLOGY STUDY: SIGNIFICANT CHANGE UP

## 2024-05-09 DIAGNOSIS — Z09 ENCOUNTER FOR FOLLOW-UP EXAMINATION AFTER COMPLETED TREATMENT FOR CONDITIONS OTHER THAN MALIGNANT NEOPLASM: ICD-10-CM

## 2024-05-09 DIAGNOSIS — K64.4 RESIDUAL HEMORRHOIDAL SKIN TAGS: ICD-10-CM

## 2024-06-24 ENCOUNTER — APPOINTMENT (OUTPATIENT)
Dept: INTERNAL MEDICINE | Facility: CLINIC | Age: 54
End: 2024-06-24

## 2024-08-06 ENCOUNTER — APPOINTMENT (OUTPATIENT)
Dept: OBGYN | Facility: CLINIC | Age: 54
End: 2024-08-06

## 2024-08-06 ENCOUNTER — OUTPATIENT (OUTPATIENT)
Dept: OUTPATIENT SERVICES | Facility: HOSPITAL | Age: 54
LOS: 1 days | End: 2024-08-06
Payer: COMMERCIAL

## 2024-08-06 DIAGNOSIS — Z00.00 ENCOUNTER FOR GENERAL ADULT MEDICAL EXAMINATION WITHOUT ABNORMAL FINDINGS: ICD-10-CM

## 2024-08-06 PROCEDURE — 99396 PREV VISIT EST AGE 40-64: CPT

## 2024-08-06 NOTE — HISTORY OF PRESENT ILLNESS
[FreeTextEntry1] : 55yo P2, LMP 3months ago, presents for annual exam. Patient endorses for the past few years menses has lessened and become lighter. States last menses was in May and the one prior was in February. Also endorses mild hot flashes and feeling "wired" and having too much energy mostly at night. States recently tried to cut down on caffeine which has helped improved symptoms and mostly unbothered at this time. Declined HRT for menopausal symptoms. Denies vaginal dryness/irritation or urinary symptoms.  Last Pap 2023 NILM, HPV neg Last Mammo (2023) BIRADS 2 Last Colonoscopy: (2024) sigmoid polyp, external hemorrhoids, repeat colonoscopy in 5 years

## 2024-08-06 NOTE — PHYSICAL EXAM
Physical Therapy Initial Evaluation  Subjective:  The history is provided by the patient and medical records. No  was used.                 S:   Pt reports that about a week ago started to have dizziness. Notes that having symptoms when getting up and down. Notes that worse with laying down. Feels like spinning. Lasts for seconds to minutes. Did have a recent sinus infection.   Emerald is a 50 year old patient complaining of vertigo.  Onset of symptoms: 3/2/22      Is this a recurrent problem: no  Progression since onset: no change  Frequency of episodes/time of day: getting up/down. Dependent on position  Duration of episodes: seconds-minutes  Exacerbating factors: movement  Relieving factors: being still, time  Previous treatments:  na  Special tests/diagnostics performed: na  Significant medical hx: H/o sinus infection, high blood pressure, asthma  Meds: high blood pressure, alegra, afrin  Occupation: Psychologist   Work requirements: computer work, prolonged symptoms  General health reported by patient: good  Red Flags: denies (weakness, HA, numbness, tingling, vision/hearing changes) Edu on red flag symptoms if were to experience/indications emergent follow up      O:  Cervical ROM screen: ROM wfl, no symptoms  Oculomotor/gaze stability screen: snl saccades and smooth pursuits  Vertebral artery test: negative  Hallpike-Chauncey maneuver:  R: negative  L: positive, w/ reversal with return to sit  Horizontal canal test:  R: negative  L: negative  Balance testing:  Functional balance wnl, no formal assessment completed today      Objective:  System    Physical Exam    General     ROS    Assessment/Plan:    Patient is a 50 year old female with veritgo complaints.    Patient has the following significant findings with corresponding treatment plan.                Diagnosis 1:  L BPPV  Impaired balance - neuro re-education and CRP  Vertigo - CRP    Therapy Evaluation Codes:   1) History comprised  of:   Personal factors that impact the plan of care:      Past/current experiences and Time since onset of symptoms.    Comorbidity factors that impact the plan of care are:      history sinus infection.     Medications impacting care: High blood pressure.  2) Examination of Body Systems comprised of:   Body structures and functions that impact the plan of care:      Vertigo.   Activity limitations that impact the plan of care are:      Bending, Dressing, Squatting/kneeling and Laying down.  3) Clinical presentation characteristics are:   Stable/Uncomplicated.  4) Decision-Making    Low complexity using standardized patient assessment instrument and/or measureable assessment of functional outcome.  Cumulative Therapy Evaluation is: Low complexity.    Previous and current functional limitations:  (See Goal Flow Sheet for this information)    Short term and Long term goals: (See Goal Flow Sheet for this information)     Communication ability:  Patient appears to be able to clearly communicate and understand verbal and written communication and follow directions correctly.  Treatment Explanation - The following has been discussed with the patient:   RX ordered/plan of care  Anticipated outcomes  Possible risks and side effects  This patient would benefit from PT intervention to resume normal activities.   Rehab potential is excellent.    Frequency:  1 X week, once daily  Duration:  for 4 weeks  Discharge Plan:  Achieve all LTG.  Independent in home treatment program.  Reach maximal therapeutic benefit.    Please refer to the daily flowsheet for treatment today, total treatment time and time spent performing 1:1 timed codes.        [Appropriately responsive] : appropriately responsive [Alert] : alert [No Acute Distress] : no acute distress [No Lymphadenopathy] : no lymphadenopathy [Regular Rate Rhythm] : regular rate rhythm [No Murmurs] : no murmurs [Clear to Auscultation B/L] : clear to auscultation bilaterally [Soft] : soft [Non-tender] : non-tender [Non-distended] : non-distended [No HSM] : No HSM [No Lesions] : no lesions [No Mass] : no mass [Oriented x3] : oriented x3 [Examination Of The Breasts] : a normal appearance [No Masses] : no breast masses were palpable [Labia Majora] : normal [Labia Minora] : normal [Normal] : normal [Uterine Adnexae] : normal

## 2024-08-06 NOTE — PLAN
[FreeTextEntry1] : 55yo P2 LMP in May, s/p normal annual exam - UTD on pap and colonoscopy - script for mammo given - RTC in 1 yr or PRN

## 2024-08-07 DIAGNOSIS — Z01.419 ENCOUNTER FOR GYNECOLOGICAL EXAMINATION (GENERAL) (ROUTINE) WITHOUT ABNORMAL FINDINGS: ICD-10-CM

## 2024-08-09 ENCOUNTER — RESULT REVIEW (OUTPATIENT)
Age: 54
End: 2024-08-09

## 2024-08-09 ENCOUNTER — OUTPATIENT (OUTPATIENT)
Dept: OUTPATIENT SERVICES | Facility: HOSPITAL | Age: 54
LOS: 1 days | End: 2024-08-09
Payer: COMMERCIAL

## 2024-08-09 DIAGNOSIS — Z12.31 ENCOUNTER FOR SCREENING MAMMOGRAM FOR MALIGNANT NEOPLASM OF BREAST: ICD-10-CM

## 2024-08-09 PROCEDURE — 77063 BREAST TOMOSYNTHESIS BI: CPT

## 2024-08-09 PROCEDURE — 77063 BREAST TOMOSYNTHESIS BI: CPT | Mod: 26

## 2024-08-09 PROCEDURE — 77067 SCR MAMMO BI INCL CAD: CPT | Mod: 26

## 2024-08-09 PROCEDURE — 77067 SCR MAMMO BI INCL CAD: CPT

## 2024-08-10 DIAGNOSIS — Z12.31 ENCOUNTER FOR SCREENING MAMMOGRAM FOR MALIGNANT NEOPLASM OF BREAST: ICD-10-CM

## 2024-08-15 ENCOUNTER — RESULT REVIEW (OUTPATIENT)
Age: 54
End: 2024-08-15

## 2024-09-12 ENCOUNTER — OUTPATIENT (OUTPATIENT)
Dept: OUTPATIENT SERVICES | Facility: HOSPITAL | Age: 54
LOS: 1 days | End: 2024-09-12
Payer: COMMERCIAL

## 2024-09-12 PROCEDURE — 85027 COMPLETE CBC AUTOMATED: CPT

## 2024-09-12 PROCEDURE — 80061 LIPID PANEL: CPT

## 2024-09-12 PROCEDURE — 82306 VITAMIN D 25 HYDROXY: CPT

## 2024-09-12 PROCEDURE — 83036 HEMOGLOBIN GLYCOSYLATED A1C: CPT

## 2024-09-12 PROCEDURE — 80053 COMPREHEN METABOLIC PANEL: CPT

## 2024-09-12 PROCEDURE — 84443 ASSAY THYROID STIM HORMONE: CPT

## 2024-09-18 ENCOUNTER — APPOINTMENT (OUTPATIENT)
Dept: INTERNAL MEDICINE | Facility: CLINIC | Age: 54
End: 2024-09-18
Payer: COMMERCIAL

## 2024-09-18 VITALS
HEART RATE: 60 BPM | OXYGEN SATURATION: 100 % | DIASTOLIC BLOOD PRESSURE: 77 MMHG | HEIGHT: 61 IN | TEMPERATURE: 97.9 F | WEIGHT: 124 LBS | SYSTOLIC BLOOD PRESSURE: 126 MMHG | BODY MASS INDEX: 23.41 KG/M2

## 2024-09-18 DIAGNOSIS — Z12.11 ENCOUNTER FOR SCREENING FOR MALIGNANT NEOPLASM OF COLON: ICD-10-CM

## 2024-09-18 DIAGNOSIS — Z00.00 ENCOUNTER FOR GENERAL ADULT MEDICAL EXAMINATION WITHOUT ABNORMAL FINDINGS: ICD-10-CM

## 2024-09-18 DIAGNOSIS — Z12.12 ENCOUNTER FOR SCREENING FOR MALIGNANT NEOPLASM OF COLON: ICD-10-CM

## 2024-09-18 DIAGNOSIS — E55.9 VITAMIN D DEFICIENCY, UNSPECIFIED: ICD-10-CM

## 2024-09-18 DIAGNOSIS — R73.03 PREDIABETES.: ICD-10-CM

## 2024-09-18 DIAGNOSIS — R92.30 DENSE BREASTS, UNSPECIFIED: ICD-10-CM

## 2024-09-18 PROCEDURE — G2211 COMPLEX E/M VISIT ADD ON: CPT | Mod: NC

## 2024-09-18 PROCEDURE — 99214 OFFICE O/P EST MOD 30 MIN: CPT

## 2024-09-18 RX ORDER — VITAMIN K2 90 MCG
125 MCG CAPSULE ORAL DAILY
Qty: 90 | Refills: 2 | Status: ACTIVE | COMMUNITY
Start: 2024-09-18 | End: 1900-01-01

## 2024-09-18 NOTE — PHYSICAL EXAM
[No Acute Distress] : no acute distress [Normal Sclera/Conjunctiva] : normal sclera/conjunctiva [No JVD] : no jugular venous distention [No Respiratory Distress] : no respiratory distress  [Normal Rate] : normal rate  [No Carotid Bruits] : no carotid bruits [No Edema] : there was no peripheral edema [Soft] : abdomen soft [Non Tender] : non-tender [No CVA Tenderness] : no CVA  tenderness [Coordination Grossly Intact] : coordination grossly intact [Normal] : normal gait, coordination grossly intact, no focal deficits and deep tendon reflexes were 2+ and symmetric

## 2024-09-19 DIAGNOSIS — Z00.00 ENCOUNTER FOR GENERAL ADULT MEDICAL EXAMINATION WITHOUT ABNORMAL FINDINGS: ICD-10-CM

## 2025-03-14 ENCOUNTER — OUTPATIENT (OUTPATIENT)
Dept: OUTPATIENT SERVICES | Facility: HOSPITAL | Age: 55
LOS: 1 days | End: 2025-03-14
Payer: COMMERCIAL

## 2025-03-14 ENCOUNTER — LABORATORY RESULT (OUTPATIENT)
Age: 55
End: 2025-03-14

## 2025-03-14 ENCOUNTER — RESULT REVIEW (OUTPATIENT)
Age: 55
End: 2025-03-14

## 2025-03-14 DIAGNOSIS — D24.1 BENIGN NEOPLASM OF RIGHT BREAST: ICD-10-CM

## 2025-03-14 DIAGNOSIS — R92.8 OTHER ABNORMAL AND INCONCLUSIVE FINDINGS ON DIAGNOSTIC IMAGING OF BREAST: ICD-10-CM

## 2025-03-14 DIAGNOSIS — Z00.00 ENCOUNTER FOR GENERAL ADULT MEDICAL EXAMINATION WITHOUT ABNORMAL FINDINGS: ICD-10-CM

## 2025-03-14 PROCEDURE — 76642 ULTRASOUND BREAST LIMITED: CPT | Mod: 26,RT

## 2025-03-14 PROCEDURE — 84439 ASSAY OF FREE THYROXINE: CPT

## 2025-03-14 PROCEDURE — 80061 LIPID PANEL: CPT

## 2025-03-14 PROCEDURE — 77065 DX MAMMO INCL CAD UNI: CPT | Mod: 26,RT

## 2025-03-14 PROCEDURE — 84443 ASSAY THYROID STIM HORMONE: CPT

## 2025-03-14 PROCEDURE — G0279: CPT

## 2025-03-14 PROCEDURE — 76642 ULTRASOUND BREAST LIMITED: CPT | Mod: RT

## 2025-03-14 PROCEDURE — G0279: CPT | Mod: 26

## 2025-03-14 PROCEDURE — 82306 VITAMIN D 25 HYDROXY: CPT

## 2025-03-14 PROCEDURE — 83036 HEMOGLOBIN GLYCOSYLATED A1C: CPT

## 2025-03-14 PROCEDURE — 77065 DX MAMMO INCL CAD UNI: CPT | Mod: RT

## 2025-03-14 PROCEDURE — 85025 COMPLETE CBC W/AUTO DIFF WBC: CPT

## 2025-03-14 PROCEDURE — 80053 COMPREHEN METABOLIC PANEL: CPT

## 2025-03-15 DIAGNOSIS — D24.1 BENIGN NEOPLASM OF RIGHT BREAST: ICD-10-CM

## 2025-03-15 DIAGNOSIS — Z00.00 ENCOUNTER FOR GENERAL ADULT MEDICAL EXAMINATION WITHOUT ABNORMAL FINDINGS: ICD-10-CM

## 2025-03-26 ENCOUNTER — OUTPATIENT (OUTPATIENT)
Dept: OUTPATIENT SERVICES | Facility: HOSPITAL | Age: 55
LOS: 1 days | End: 2025-03-26
Payer: COMMERCIAL

## 2025-03-26 ENCOUNTER — APPOINTMENT (OUTPATIENT)
Dept: INTERNAL MEDICINE | Facility: CLINIC | Age: 55
End: 2025-03-26
Payer: COMMERCIAL

## 2025-03-26 VITALS
HEART RATE: 72 BPM | OXYGEN SATURATION: 98 % | DIASTOLIC BLOOD PRESSURE: 83 MMHG | TEMPERATURE: 97.9 F | WEIGHT: 125 LBS | BODY MASS INDEX: 23.6 KG/M2 | SYSTOLIC BLOOD PRESSURE: 130 MMHG | HEIGHT: 61 IN

## 2025-03-26 DIAGNOSIS — Z00.00 ENCOUNTER FOR GENERAL ADULT MEDICAL EXAMINATION W/OUT ABNORMAL FINDINGS: ICD-10-CM

## 2025-03-26 DIAGNOSIS — Z00.00 ENCOUNTER FOR GENERAL ADULT MEDICAL EXAMINATION WITHOUT ABNORMAL FINDINGS: ICD-10-CM

## 2025-03-26 PROCEDURE — 99214 OFFICE O/P EST MOD 30 MIN: CPT

## 2025-03-26 PROCEDURE — G2211 COMPLEX E/M VISIT ADD ON: CPT | Mod: NC

## 2025-04-01 DIAGNOSIS — Z00.00 ENCOUNTER FOR GENERAL ADULT MEDICAL EXAMINATION WITHOUT ABNORMAL FINDINGS: ICD-10-CM

## 2025-05-13 ENCOUNTER — OUTPATIENT (OUTPATIENT)
Dept: OUTPATIENT SERVICES | Facility: HOSPITAL | Age: 55
LOS: 1 days | End: 2025-05-13

## 2025-05-13 ENCOUNTER — APPOINTMENT (OUTPATIENT)
Dept: OPHTHALMOLOGY | Facility: CLINIC | Age: 55
End: 2025-05-13

## 2025-05-13 DIAGNOSIS — H53.8 OTHER VISUAL DISTURBANCES: ICD-10-CM

## 2025-08-12 ENCOUNTER — APPOINTMENT (OUTPATIENT)
Dept: OBGYN | Facility: CLINIC | Age: 55
End: 2025-08-12
Payer: COMMERCIAL

## 2025-08-12 ENCOUNTER — OUTPATIENT (OUTPATIENT)
Dept: OUTPATIENT SERVICES | Facility: HOSPITAL | Age: 55
LOS: 1 days | End: 2025-08-12
Payer: COMMERCIAL

## 2025-08-12 VITALS
HEIGHT: 61 IN | SYSTOLIC BLOOD PRESSURE: 109 MMHG | BODY MASS INDEX: 22.86 KG/M2 | DIASTOLIC BLOOD PRESSURE: 71 MMHG | WEIGHT: 121.06 LBS

## 2025-08-12 DIAGNOSIS — Z01.419 ENCOUNTER FOR GYNECOLOGICAL EXAMINATION (GENERAL) (ROUTINE) WITHOUT ABNORMAL FINDINGS: ICD-10-CM

## 2025-08-12 DIAGNOSIS — Z01.419 ENCOUNTER FOR GYNECOLOGICAL EXAMINATION (GENERAL) (ROUTINE) W/OUT ABNORMAL FINDINGS: ICD-10-CM

## 2025-08-12 DIAGNOSIS — D24.1 BENIGN NEOPLASM OF RIGHT BREAST: ICD-10-CM

## 2025-08-12 DIAGNOSIS — N95.1 MENOPAUSAL AND FEMALE CLIMACTERIC STATES: ICD-10-CM

## 2025-08-12 DIAGNOSIS — R23.2 FLUSHING: ICD-10-CM

## 2025-08-12 PROCEDURE — T1013: CPT

## 2025-08-12 PROCEDURE — 99459 PELVIC EXAMINATION: CPT

## 2025-08-12 PROCEDURE — 99396 PREV VISIT EST AGE 40-64: CPT

## 2025-08-12 PROCEDURE — G0444: CPT

## 2025-08-13 DIAGNOSIS — Z01.419 ENCOUNTER FOR GYNECOLOGICAL EXAMINATION (GENERAL) (ROUTINE) WITHOUT ABNORMAL FINDINGS: ICD-10-CM
